# Patient Record
Sex: FEMALE | Race: WHITE | NOT HISPANIC OR LATINO | Employment: OTHER | URBAN - METROPOLITAN AREA
[De-identification: names, ages, dates, MRNs, and addresses within clinical notes are randomized per-mention and may not be internally consistent; named-entity substitution may affect disease eponyms.]

---

## 2017-01-21 DIAGNOSIS — G20.A1 PARKINSON DISEASE: ICD-10-CM

## 2017-01-24 RX ORDER — ENTACAPONE 200 MG/1
TABLET ORAL
Qty: 120 TAB | Refills: 2 | Status: SHIPPED | OUTPATIENT
Start: 2017-01-24 | End: 2017-02-25

## 2017-01-31 ENCOUNTER — TELEPHONE (OUTPATIENT)
Dept: NEUROLOGY | Facility: MEDICAL CENTER | Age: 72
End: 2017-01-31

## 2017-02-01 NOTE — TELEPHONE ENCOUNTER
Pt's sister is calling and she needed clarification on her sisters medication. She is taking the Sinemet 25/100 mg 2 tablets TID when she is to be taking 1 tablet TID. Pt's sister verbally understood clarification and will make sure her sister is only taking 1 tablet TID until her appointment with Dr. Harrell on 2/28/17. TURNER

## 2017-02-25 ENCOUNTER — APPOINTMENT (OUTPATIENT)
Dept: RADIOLOGY | Facility: MEDICAL CENTER | Age: 72
DRG: 565 | End: 2017-02-25
Attending: EMERGENCY MEDICINE
Payer: MEDICARE

## 2017-02-25 ENCOUNTER — HOSPITAL ENCOUNTER (INPATIENT)
Facility: MEDICAL CENTER | Age: 72
LOS: 3 days | DRG: 565 | End: 2017-02-28
Attending: EMERGENCY MEDICINE | Admitting: HOSPITALIST
Payer: MEDICARE

## 2017-02-25 ENCOUNTER — RESOLUTE PROFESSIONAL BILLING HOSPITAL PROF FEE (OUTPATIENT)
Dept: HOSPITALIST | Facility: MEDICAL CENTER | Age: 72
End: 2017-02-25
Payer: MEDICARE

## 2017-02-25 DIAGNOSIS — G20.A1 PARKINSON'S DISEASE: ICD-10-CM

## 2017-02-25 DIAGNOSIS — G20.B1 DYSKINESIA DUE TO PARKINSON'S DISEASE: ICD-10-CM

## 2017-02-25 DIAGNOSIS — R74.8 ELEVATED CPK: ICD-10-CM
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
## 2017-02-25 DIAGNOSIS — E87.6 HYPOKALEMIA: ICD-10-CM

## 2017-02-25 DIAGNOSIS — N30.01 ACUTE CYSTITIS WITH HEMATURIA: ICD-10-CM

## 2017-02-25 DIAGNOSIS — N39.0 URINARY TRACT INFECTION WITHOUT HEMATURIA, SITE UNSPECIFIED: ICD-10-CM

## 2017-02-25 DIAGNOSIS — R53.1 WEAKNESS: ICD-10-CM

## 2017-02-25 PROBLEM — M62.82 RHABDOMYOLYSIS: Status: ACTIVE | Noted: 2017-02-25

## 2017-02-25 PROBLEM — F03.90 DEMENTIA (HCC): Status: ACTIVE | Noted: 2017-02-25

## 2017-02-25 LAB
ALBUMIN SERPL BCP-MCNC: 4 G/DL (ref 3.2–4.9)
ALBUMIN/GLOB SERPL: 1.4 G/DL
ALP SERPL-CCNC: 110 U/L (ref 30–99)
ALT SERPL-CCNC: 25 U/L (ref 2–50)
ANION GAP SERPL CALC-SCNC: 8 MMOL/L (ref 0–11.9)
APPEARANCE UR: ABNORMAL
APTT PPP: 30.4 SEC (ref 24.7–36)
AST SERPL-CCNC: 51 U/L (ref 12–45)
BACTERIA #/AREA URNS HPF: ABNORMAL /HPF
BASOPHILS # BLD AUTO: 0.4 % (ref 0–1.8)
BASOPHILS # BLD: 0.04 K/UL (ref 0–0.12)
BILIRUB SERPL-MCNC: 1 MG/DL (ref 0.1–1.5)
BILIRUB UR QL STRIP.AUTO: NEGATIVE
BNP SERPL-MCNC: 87 PG/ML (ref 0–100)
BUN SERPL-MCNC: 13 MG/DL (ref 8–22)
CALCIUM SERPL-MCNC: 9 MG/DL (ref 8.4–10.2)
CHLORIDE SERPL-SCNC: 104 MMOL/L (ref 96–112)
CK SERPL-CCNC: 1411 U/L (ref 0–154)
CO2 SERPL-SCNC: 26 MMOL/L (ref 20–33)
COLOR UR: ABNORMAL
CREAT SERPL-MCNC: 0.54 MG/DL (ref 0.5–1.4)
CULTURE IF INDICATED INDCX: YES UA CULTURE
EOSINOPHIL # BLD AUTO: 0.01 K/UL (ref 0–0.51)
EOSINOPHIL NFR BLD: 0.1 % (ref 0–6.9)
EPI CELLS #/AREA URNS HPF: ABNORMAL /HPF
ERYTHROCYTE [DISTWIDTH] IN BLOOD BY AUTOMATED COUNT: 40.1 FL (ref 35.9–50)
GFR SERPL CREATININE-BSD FRML MDRD: >60 ML/MIN/1.73 M 2
GLOBULIN SER CALC-MCNC: 2.9 G/DL (ref 1.9–3.5)
GLUCOSE SERPL-MCNC: 94 MG/DL (ref 65–99)
GLUCOSE UR STRIP.AUTO-MCNC: NEGATIVE MG/DL
HCT VFR BLD AUTO: 40.7 % (ref 37–47)
HGB BLD-MCNC: 13.9 G/DL (ref 12–16)
IMM GRANULOCYTES # BLD AUTO: 0.03 K/UL (ref 0–0.11)
IMM GRANULOCYTES NFR BLD AUTO: 0.3 % (ref 0–0.9)
INR PPP: 1.01 (ref 0.87–1.13)
KETONES UR STRIP.AUTO-MCNC: 40 MG/DL
LEUKOCYTE ESTERASE UR QL STRIP.AUTO: NEGATIVE
LYMPHOCYTES # BLD AUTO: 1.3 K/UL (ref 1–4.8)
LYMPHOCYTES NFR BLD: 14 % (ref 22–41)
MCH RBC QN AUTO: 29.3 PG (ref 27–33)
MCHC RBC AUTO-ENTMCNC: 34.2 G/DL (ref 33.6–35)
MCV RBC AUTO: 85.7 FL (ref 81.4–97.8)
MICRO URNS: ABNORMAL
MONOCYTES # BLD AUTO: 0.76 K/UL (ref 0–0.85)
MONOCYTES NFR BLD AUTO: 8.2 % (ref 0–13.4)
MUCOUS THREADS #/AREA URNS HPF: ABNORMAL /HPF
NEUTROPHILS # BLD AUTO: 7.15 K/UL (ref 2–7.15)
NEUTROPHILS NFR BLD: 77 % (ref 44–72)
NITRITE UR QL STRIP.AUTO: POSITIVE
NRBC # BLD AUTO: 0 K/UL
NRBC BLD AUTO-RTO: 0 /100 WBC
PH UR STRIP.AUTO: 5.5 [PH]
PLATELET # BLD AUTO: 233 K/UL (ref 164–446)
PMV BLD AUTO: 9.9 FL (ref 9–12.9)
POTASSIUM SERPL-SCNC: 3.1 MMOL/L (ref 3.6–5.5)
PROT SERPL-MCNC: 6.9 G/DL (ref 6–8.2)
PROT UR QL STRIP: NEGATIVE MG/DL
PROTHROMBIN TIME: 13.1 SEC (ref 12–14.6)
RBC # BLD AUTO: 4.75 M/UL (ref 4.2–5.4)
RBC # URNS HPF: ABNORMAL /HPF
RBC UR QL AUTO: ABNORMAL
SODIUM SERPL-SCNC: 138 MMOL/L (ref 135–145)
SP GR UR STRIP.AUTO: 1.02
TROPONIN I SERPL-MCNC: <0.02 NG/ML (ref 0–0.04)
TSH SERPL DL<=0.005 MIU/L-ACNC: 2.35 UIU/ML (ref 0.35–5.5)
WBC # BLD AUTO: 9.3 K/UL (ref 4.8–10.8)
WBC #/AREA URNS HPF: ABNORMAL /HPF

## 2017-02-25 PROCEDURE — 87086 URINE CULTURE/COLONY COUNT: CPT

## 2017-02-25 PROCEDURE — A9270 NON-COVERED ITEM OR SERVICE: HCPCS | Performed by: HOSPITALIST

## 2017-02-25 PROCEDURE — 99223 1ST HOSP IP/OBS HIGH 75: CPT | Mod: AI | Performed by: HOSPITALIST

## 2017-02-25 PROCEDURE — 80053 COMPREHEN METABOLIC PANEL: CPT

## 2017-02-25 PROCEDURE — 96365 THER/PROPH/DIAG IV INF INIT: CPT

## 2017-02-25 PROCEDURE — 85730 THROMBOPLASTIN TIME PARTIAL: CPT

## 2017-02-25 PROCEDURE — 700101 HCHG RX REV CODE 250: Performed by: HOSPITALIST

## 2017-02-25 PROCEDURE — 770006 HCHG ROOM/CARE - MED/SURG/GYN SEMI*

## 2017-02-25 PROCEDURE — 700111 HCHG RX REV CODE 636 W/ 250 OVERRIDE (IP): Performed by: HOSPITALIST

## 2017-02-25 PROCEDURE — 70450 CT HEAD/BRAIN W/O DYE: CPT

## 2017-02-25 PROCEDURE — 71010 DX-CHEST-LIMITED (1 VIEW): CPT

## 2017-02-25 PROCEDURE — 84484 ASSAY OF TROPONIN QUANT: CPT

## 2017-02-25 PROCEDURE — 87040 BLOOD CULTURE FOR BACTERIA: CPT

## 2017-02-25 PROCEDURE — 82550 ASSAY OF CK (CPK): CPT

## 2017-02-25 PROCEDURE — 87186 SC STD MICRODIL/AGAR DIL: CPT

## 2017-02-25 PROCEDURE — 99285 EMERGENCY DEPT VISIT HI MDM: CPT

## 2017-02-25 PROCEDURE — 93005 ELECTROCARDIOGRAM TRACING: CPT | Performed by: EMERGENCY MEDICINE

## 2017-02-25 PROCEDURE — 83880 ASSAY OF NATRIURETIC PEPTIDE: CPT

## 2017-02-25 PROCEDURE — 73552 X-RAY EXAM OF FEMUR 2/>: CPT | Mod: RT

## 2017-02-25 PROCEDURE — 81001 URINALYSIS AUTO W/SCOPE: CPT

## 2017-02-25 PROCEDURE — 96367 TX/PROPH/DG ADDL SEQ IV INF: CPT

## 2017-02-25 PROCEDURE — 85025 COMPLETE CBC W/AUTO DIFF WBC: CPT

## 2017-02-25 PROCEDURE — 700105 HCHG RX REV CODE 258: Performed by: HOSPITALIST

## 2017-02-25 PROCEDURE — 700105 HCHG RX REV CODE 258: Performed by: EMERGENCY MEDICINE

## 2017-02-25 PROCEDURE — 84443 ASSAY THYROID STIM HORMONE: CPT

## 2017-02-25 PROCEDURE — 87077 CULTURE AEROBIC IDENTIFY: CPT

## 2017-02-25 PROCEDURE — 700102 HCHG RX REV CODE 250 W/ 637 OVERRIDE(OP): Performed by: HOSPITALIST

## 2017-02-25 PROCEDURE — 700111 HCHG RX REV CODE 636 W/ 250 OVERRIDE (IP): Performed by: EMERGENCY MEDICINE

## 2017-02-25 PROCEDURE — 85610 PROTHROMBIN TIME: CPT

## 2017-02-25 PROCEDURE — 73060 X-RAY EXAM OF HUMERUS: CPT | Mod: RT

## 2017-02-25 RX ORDER — PRAMIPEXOLE DIHYDROCHLORIDE 1.5 MG/1
0.75 TABLET ORAL 3 TIMES DAILY
Status: DISCONTINUED | OUTPATIENT
Start: 2017-02-25 | End: 2017-02-28 | Stop reason: HOSPADM

## 2017-02-25 RX ORDER — LACTULOSE 20 G/30ML
30 SOLUTION ORAL
Status: DISCONTINUED | OUTPATIENT
Start: 2017-02-25 | End: 2017-02-28 | Stop reason: HOSPADM

## 2017-02-25 RX ORDER — POTASSIUM CHLORIDE 7.45 MG/ML
10 INJECTION INTRAVENOUS ONCE
Status: COMPLETED | OUTPATIENT
Start: 2017-02-25 | End: 2017-02-25

## 2017-02-25 RX ORDER — ONDANSETRON 4 MG/1
4 TABLET, ORALLY DISINTEGRATING ORAL EVERY 4 HOURS PRN
Status: DISCONTINUED | OUTPATIENT
Start: 2017-02-25 | End: 2017-02-28 | Stop reason: HOSPADM

## 2017-02-25 RX ORDER — SODIUM CHLORIDE 9 MG/ML
INJECTION, SOLUTION INTRAVENOUS CONTINUOUS
Status: DISCONTINUED | OUTPATIENT
Start: 2017-02-25 | End: 2017-02-26

## 2017-02-25 RX ORDER — ONDANSETRON 2 MG/ML
4 INJECTION INTRAMUSCULAR; INTRAVENOUS EVERY 4 HOURS PRN
Status: DISCONTINUED | OUTPATIENT
Start: 2017-02-25 | End: 2017-02-28 | Stop reason: HOSPADM

## 2017-02-25 RX ORDER — AMOXICILLIN 250 MG
1 CAPSULE ORAL
Status: DISCONTINUED | OUTPATIENT
Start: 2017-02-25 | End: 2017-02-28 | Stop reason: HOSPADM

## 2017-02-25 RX ORDER — CEFTRIAXONE 1 G/1
1 INJECTION, POWDER, FOR SOLUTION INTRAMUSCULAR; INTRAVENOUS ONCE
Status: COMPLETED | OUTPATIENT
Start: 2017-02-25 | End: 2017-02-25

## 2017-02-25 RX ORDER — SODIUM CHLORIDE AND POTASSIUM CHLORIDE 150; 900 MG/100ML; MG/100ML
INJECTION, SOLUTION INTRAVENOUS CONTINUOUS
Status: DISCONTINUED | OUTPATIENT
Start: 2017-02-25 | End: 2017-02-26

## 2017-02-25 RX ORDER — ENEMA 19; 7 G/133ML; G/133ML
1 ENEMA RECTAL
Status: DISCONTINUED | OUTPATIENT
Start: 2017-02-25 | End: 2017-02-28 | Stop reason: HOSPADM

## 2017-02-25 RX ORDER — SODIUM CHLORIDE 9 MG/ML
INJECTION, SOLUTION INTRAVENOUS CONTINUOUS
Status: DISCONTINUED | OUTPATIENT
Start: 2017-02-25 | End: 2017-02-28 | Stop reason: HOSPADM

## 2017-02-25 RX ORDER — BISACODYL 10 MG
10 SUPPOSITORY, RECTAL RECTAL
Status: DISCONTINUED | OUTPATIENT
Start: 2017-02-25 | End: 2017-02-28 | Stop reason: HOSPADM

## 2017-02-25 RX ORDER — ENTACAPONE 200 MG/1
200 TABLET ORAL 3 TIMES DAILY
Status: DISCONTINUED | OUTPATIENT
Start: 2017-02-25 | End: 2017-02-28 | Stop reason: HOSPADM

## 2017-02-25 RX ORDER — POTASSIUM CHLORIDE 20 MEQ/1
40 TABLET, EXTENDED RELEASE ORAL ONCE
Status: ACTIVE | OUTPATIENT
Start: 2017-02-25 | End: 2017-02-26

## 2017-02-25 RX ORDER — ACETAMINOPHEN 325 MG/1
650 TABLET ORAL EVERY 6 HOURS PRN
Status: DISCONTINUED | OUTPATIENT
Start: 2017-02-25 | End: 2017-02-28 | Stop reason: HOSPADM

## 2017-02-25 RX ADMIN — ENOXAPARIN SODIUM 40 MG: 100 INJECTION SUBCUTANEOUS at 16:52

## 2017-02-25 RX ADMIN — POTASSIUM CHLORIDE 10 MEQ: 10 INJECTION, SOLUTION INTRAVENOUS at 14:45

## 2017-02-25 RX ADMIN — CEFTRIAXONE 1 G: 1 INJECTION, POWDER, FOR SOLUTION INTRAMUSCULAR; INTRAVENOUS at 13:44

## 2017-02-25 RX ADMIN — SODIUM CHLORIDE: 9 INJECTION, SOLUTION INTRAVENOUS at 13:43

## 2017-02-25 RX ADMIN — POTASSIUM CHLORIDE AND SODIUM CHLORIDE: 900; 150 INJECTION, SOLUTION INTRAVENOUS at 16:50

## 2017-02-25 RX ADMIN — CEFTRIAXONE SODIUM 1000 MG: 1 INJECTION, POWDER, FOR SOLUTION INTRAMUSCULAR; INTRAVENOUS at 16:47

## 2017-02-25 ASSESSMENT — LIFESTYLE VARIABLES
ALCOHOL_USE: NO
EVER_SMOKED: YES

## 2017-02-25 NOTE — ED NOTES
"Chief Complaint   Patient presents with   • GLF     this AM, unknown time   • Shoulder Injury     R    • Hip Injury     R   • Facial Injury     R     /77 mmHg  Pulse 80  Temp(Src) 37.3 °C (99.1 °F)  Resp 18  Ht 1.549 m (5' 1\")  Wt 61.689 kg (136 lb)  BMI 25.71 kg/m2  SpO2 97%    "

## 2017-02-25 NOTE — ED NOTES
The Medication Reconciliation process has been completed by interviewing the patient    Allergies have been reviewed  Antibiotic use in 30 days - NONE    Home Pharmacy:  [facility] - [place]

## 2017-02-25 NOTE — ED PROVIDER NOTES
ED Provider Note    CHIEF COMPLAINT  Chief Complaint   Patient presents with   • GLF     this AM, unknown time   • Shoulder Injury     R    • Hip Injury     R   • Facial Injury     R       HPI  Paola Brito is a 71 y.o. female who presents to the emergency department brought in by ambulance after falling in her kitchen. The patient has a history of dementia and usually gets around with her walker but was found lying on her right side on the kitchen floor earlier today. Potentially she could've been on the ground for as long as 6 hours but she is not really sure what time she fell. The patient was noted to have an abrasion on the right side of the head and complained of right shoulder pain and she indicates discomfort in the right mid thigh. She has not recently been ill that she has been falling frequently recently.    REVIEW OF SYSTEMS no headache no neck pain no chest pain or palpitations no shortness of breath. All other systems negative    PAST MEDICAL HISTORY  Past Medical History   Diagnosis Date   • Parkinson's disease (CMS-HCC)    • Urinary incontinence    • Hypertension        FAMILY HISTORY  Family History   Problem Relation Age of Onset   • Cancer Father    • Lung Disease Father    • Heart Disease Father    • Heart Disease Maternal Grandmother    • Heart Disease Maternal Grandfather    • Stroke Paternal Grandmother    • Stroke Paternal Grandfather        SOCIAL HISTORY  Social History     Social History   • Marital Status: Single     Spouse Name: N/A   • Number of Children: N/A   • Years of Education: N/A     Social History Main Topics   • Smoking status: Former Smoker     Types: Cigarettes   • Smokeless tobacco: None   • Alcohol Use: Yes      Comment: a couple glasses a couple times per week   • Drug Use: No   • Sexual Activity: Not Asked     Other Topics Concern   • None     Social History Narrative       SURGICAL HISTORY  Past Surgical History   Procedure Laterality Date   • Tonsillectomy N/A   "      CURRENT MEDICATIONS  Home Medications     Reviewed by Shayan Thomas (Pharmacy Tech) on 17 at 1255  Med List Status: Complete    Medication Last Dose Status    carbidopa-levodopa (SINEMET)  MG Tab 2017 Active    entacapone (COMTAN) 200 MG Tab 2017 Active    Pramipexole Dihydrochloride 0.75 MG Tab 2017 Active    sertraline (ZOLOFT) 50 MG Tab 2017 Active    spironolactone (ALDACTONE) 25 MG Tab 2017 Active                ALLERGIES  Allergies   Allergen Reactions   • Morphine Unspecified     Rxn - early    Family reports that she almost    • Pcn [Penicillins] Unspecified     Rxn - unspecified       PHYSICAL EXAM  VITAL SIGNS: /77 mmHg  Pulse 79  Temp(Src) 37.8 °C (100 °F)  Resp 14  Ht 1.549 m (5' 1\")  Wt 61.689 kg (136 lb)  BMI 25.71 kg/m2  SpO2 91%   Oxygen saturation is interpreted as adequate  Constitutional: Awake and verbal and she does not appear distressed  HENT: There is very slight erythema and swelling on the right side of the head consistent with mild contusion  Eyes: Extraocular motion is present without difficulty  Neck: Trachea midline no JVD no C-spine tenderness  Cardiovascular: Regular rate and rhythm  Lungs: Clear and equal bilaterally with no apparent difficulty breathing  Abdomen/Back: Soft nontender nondistended no peritoneal findings  Skin: Warm and dry  Musculoskeletal: No acute bony deformity, subjectively the patient had discomfort in the right shoulder but has full range of motion and I don't see any bony deformity or swelling. The right lower extremity the patient complained of mid thigh discomfort I don't see any deformity in the area the patient is able to move her right leg and right hip has adequate range of motion which does not seem especially painful.  Neurologic: Awake and verbal and moving her extremities    Laboratory  A CBC shows a white blood cell count 9.3 hemoglobin and hematocrit at 13.9 and basic " metabolic panel shows a slightly low potassium of 3.1. AST is elevated at 51 ALT is elevated at 111. Total CPK is 1411. Troponin is normal less than 0.02. INR is normal 1.01. Urinalysis is positive for nitrite and negative for leukocyte esterase there were 0-2 white blood cells but 100-150 red blood cells and moderate bacteria in the microscopic exam    EKG interpretation  A 12-lead EKG showed a sinus rhythm 78 bpm there is no pathologic ST elevation or depression or ectopy there is a left axis deviation    Radiology  CT-HEAD W/O   Final Result      1.  There is no acute intracranial hemorrhage.   2.  There is minimal right lateral infraorbital and facial superficial swelling but there are no underlying fractures. The scan is continued inferiorly to below the level of the maxillary sinuses.      DX-HUMERUS 2+ RIGHT   Final Result      1.  Negative right humerus series.      DX-FEMUR-2+ RIGHT   Final Result      1.  No radiographic evidence of acute traumatic injury.      DX-CHEST-LIMITED (1 VIEW)   Final Result      1.  No acute cardiac or pulmonary abnormalities are identified.        MEDICAL DECISION MAKING and DISPOSITION  In the emergency department an IV was established and the patient was placed on a cardiac monitor. The patient is receiving intravenous fluids and intravenous ceftriaxone and I have also ordered intravenous potassium supplementation. I reviewed the findings thus far available with the patient and her family and I reviewed the case with the hospitalist and the patient will be admitted for further evaluation and further treatment    IMPRESSION  1. Urinary tract infection with hematuria  2. Hypokalemia  3. Elevated total CPK  4. Recent falling  5. Right leg and right shoulder pain         Electronically signed by: Kirby Mays, 2/25/2017 3:00 PM

## 2017-02-26 LAB
ALBUMIN SERPL BCP-MCNC: 2.9 G/DL (ref 3.2–4.9)
ALBUMIN/GLOB SERPL: 1.3 G/DL
ALP SERPL-CCNC: 91 U/L (ref 30–99)
ALT SERPL-CCNC: 17 U/L (ref 2–50)
ANION GAP SERPL CALC-SCNC: 10 MMOL/L (ref 0–11.9)
AST SERPL-CCNC: 46 U/L (ref 12–45)
BASOPHILS # BLD AUTO: 0.6 % (ref 0–1.8)
BASOPHILS # BLD: 0.04 K/UL (ref 0–0.12)
BILIRUB SERPL-MCNC: 1.5 MG/DL (ref 0.1–1.5)
BUN SERPL-MCNC: 10 MG/DL (ref 8–22)
CALCIUM SERPL-MCNC: 8.5 MG/DL (ref 8.4–10.2)
CHLORIDE SERPL-SCNC: 112 MMOL/L (ref 96–112)
CHOLEST SERPL-MCNC: 116 MG/DL (ref 100–199)
CK SERPL-CCNC: 995 U/L (ref 0–154)
CO2 SERPL-SCNC: 20 MMOL/L (ref 20–33)
CREAT SERPL-MCNC: 0.78 MG/DL (ref 0.5–1.4)
EOSINOPHIL # BLD AUTO: 0.05 K/UL (ref 0–0.51)
EOSINOPHIL NFR BLD: 0.7 % (ref 0–6.9)
ERYTHROCYTE [DISTWIDTH] IN BLOOD BY AUTOMATED COUNT: 42.5 FL (ref 35.9–50)
GFR SERPL CREATININE-BSD FRML MDRD: >60 ML/MIN/1.73 M 2
GLOBULIN SER CALC-MCNC: 2.3 G/DL (ref 1.9–3.5)
GLUCOSE SERPL-MCNC: 66 MG/DL (ref 65–99)
HCT VFR BLD AUTO: 38 % (ref 37–47)
HDLC SERPL-MCNC: 55 MG/DL
HGB BLD-MCNC: 12.6 G/DL (ref 12–16)
IMM GRANULOCYTES # BLD AUTO: 0.03 K/UL (ref 0–0.11)
IMM GRANULOCYTES NFR BLD AUTO: 0.4 % (ref 0–0.9)
LDLC SERPL CALC-MCNC: 49 MG/DL
LYMPHOCYTES # BLD AUTO: 1.28 K/UL (ref 1–4.8)
LYMPHOCYTES NFR BLD: 17.9 % (ref 22–41)
MCH RBC QN AUTO: 29.4 PG (ref 27–33)
MCHC RBC AUTO-ENTMCNC: 33.2 G/DL (ref 33.6–35)
MCV RBC AUTO: 88.8 FL (ref 81.4–97.8)
MONOCYTES # BLD AUTO: 0.56 K/UL (ref 0–0.85)
MONOCYTES NFR BLD AUTO: 7.8 % (ref 0–13.4)
NEUTROPHILS # BLD AUTO: 5.21 K/UL (ref 2–7.15)
NEUTROPHILS NFR BLD: 72.6 % (ref 44–72)
NRBC # BLD AUTO: 0 K/UL
NRBC BLD AUTO-RTO: 0 /100 WBC
PLATELET # BLD AUTO: 207 K/UL (ref 164–446)
PMV BLD AUTO: 10 FL (ref 9–12.9)
POTASSIUM SERPL-SCNC: 3.9 MMOL/L (ref 3.6–5.5)
PROT SERPL-MCNC: 5.2 G/DL (ref 6–8.2)
RBC # BLD AUTO: 4.28 M/UL (ref 4.2–5.4)
SODIUM SERPL-SCNC: 142 MMOL/L (ref 135–145)
TRIGL SERPL-MCNC: 60 MG/DL (ref 0–149)
WBC # BLD AUTO: 7.2 K/UL (ref 4.8–10.8)

## 2017-02-26 PROCEDURE — 97535 SELF CARE MNGMENT TRAINING: CPT

## 2017-02-26 PROCEDURE — 80053 COMPREHEN METABOLIC PANEL: CPT

## 2017-02-26 PROCEDURE — 82550 ASSAY OF CK (CPK): CPT

## 2017-02-26 PROCEDURE — 700105 HCHG RX REV CODE 258: Performed by: INTERNAL MEDICINE

## 2017-02-26 PROCEDURE — G8988 SELF CARE GOAL STATUS: HCPCS | Mod: CK

## 2017-02-26 PROCEDURE — 700102 HCHG RX REV CODE 250 W/ 637 OVERRIDE(OP): Performed by: HOSPITALIST

## 2017-02-26 PROCEDURE — 700111 HCHG RX REV CODE 636 W/ 250 OVERRIDE (IP): Performed by: HOSPITALIST

## 2017-02-26 PROCEDURE — 700101 HCHG RX REV CODE 250: Performed by: HOSPITALIST

## 2017-02-26 PROCEDURE — 85025 COMPLETE CBC W/AUTO DIFF WBC: CPT

## 2017-02-26 PROCEDURE — 770006 HCHG ROOM/CARE - MED/SURG/GYN SEMI*

## 2017-02-26 PROCEDURE — 97166 OT EVAL MOD COMPLEX 45 MIN: CPT

## 2017-02-26 PROCEDURE — A9270 NON-COVERED ITEM OR SERVICE: HCPCS | Performed by: HOSPITALIST

## 2017-02-26 PROCEDURE — 94760 N-INVAS EAR/PLS OXIMETRY 1: CPT

## 2017-02-26 PROCEDURE — 99233 SBSQ HOSP IP/OBS HIGH 50: CPT | Performed by: INTERNAL MEDICINE

## 2017-02-26 PROCEDURE — 700105 HCHG RX REV CODE 258: Performed by: HOSPITALIST

## 2017-02-26 PROCEDURE — G8987 SELF CARE CURRENT STATUS: HCPCS | Mod: CL

## 2017-02-26 PROCEDURE — 80061 LIPID PANEL: CPT

## 2017-02-26 PROCEDURE — 36415 COLL VENOUS BLD VENIPUNCTURE: CPT

## 2017-02-26 RX ADMIN — PRAMIPEXOLE DIHYDROCHLORIDE 0.75 MG: 1.5 TABLET ORAL at 21:08

## 2017-02-26 RX ADMIN — SERTRALINE HYDROCHLORIDE 50 MG: 50 TABLET ORAL at 21:08

## 2017-02-26 RX ADMIN — ENOXAPARIN SODIUM 40 MG: 100 INJECTION SUBCUTANEOUS at 08:44

## 2017-02-26 RX ADMIN — CARBIDOPA AND LEVODOPA 1 TABLET: 25; 100 TABLET ORAL at 16:03

## 2017-02-26 RX ADMIN — PRAMIPEXOLE DIHYDROCHLORIDE 0.75 MG: 1.5 TABLET ORAL at 10:57

## 2017-02-26 RX ADMIN — CARBIDOPA AND LEVODOPA 1 TABLET: 25; 100 TABLET ORAL at 10:57

## 2017-02-26 RX ADMIN — ENTACAPONE 200 MG: 200 TABLET, FILM COATED ORAL at 10:57

## 2017-02-26 RX ADMIN — PRAMIPEXOLE DIHYDROCHLORIDE 0.75 MG: 1.5 TABLET ORAL at 16:03

## 2017-02-26 RX ADMIN — CARBIDOPA AND LEVODOPA 1 TABLET: 25; 100 TABLET ORAL at 21:07

## 2017-02-26 RX ADMIN — ENTACAPONE 200 MG: 200 TABLET, FILM COATED ORAL at 16:04

## 2017-02-26 RX ADMIN — POTASSIUM CHLORIDE AND SODIUM CHLORIDE: 900; 150 INJECTION, SOLUTION INTRAVENOUS at 01:38

## 2017-02-26 RX ADMIN — SODIUM CHLORIDE: 9 INJECTION, SOLUTION INTRAVENOUS at 16:03

## 2017-02-26 RX ADMIN — CEFTRIAXONE 2 G: 2 INJECTION, POWDER, FOR SOLUTION INTRAMUSCULAR; INTRAVENOUS at 08:44

## 2017-02-26 ASSESSMENT — ACTIVITIES OF DAILY LIVING (ADL): TOILETING: REQUIRES ASSIST

## 2017-02-26 NOTE — PROGRESS NOTES
1600-Admitted into room 210-1 from ER via gurney,transfered into bed via slider board,awake but not responding to questions asked,mumbles to self at times.Admit profile taken from sister,pt.lives at her home with  care giver 5x/week several hrs during the day and evenings, family cares for pt.during weekends,ambulates with walker at home.Incontinent of both urine and stool. Family reports has been falling at home,several bruising at different stages on her hip areas,BLE swelling and redness noted on her feet.Offered fluids would only take sips and spits out medication placed on her mouth.No coughing when she took fluids.Discussed POC with sister and verbalized understanding.Pt.turned to her leftside.Bed alarm activated.Will continue with care.

## 2017-02-26 NOTE — PROGRESS NOTES
Received report from day shift RN.  Patient in bed with HOB elevated.  No complaints of pain or discomfort.  Patient falling asleep.  Patient respirations regular and unlabored.  Bed rails up X 2, call light and belongings within reach, patient encouraged to call for assistance.  No needs at this time.  Will continue to monitor.

## 2017-02-26 NOTE — PROGRESS NOTES
Pt up in recliner. Took meds with thin liquid without any difficulty. Eating small bites of breakfast.

## 2017-02-26 NOTE — H&P
IDENTIFICATION:  This is a 71-year-old female.    PRIMARY CARE PHYSICIAN:  Camila Malin MD    NEUROLOGIC DOCTOR:  Eliezer Harrell MD    CHIEF COMPLAINT:  Fall with right-sided ___ injury, rhabdomyolysis, UTI,   dehydration, and hypokalemia.    HISTORY OF PRESENT ILLNESS:  A 71-year-old female that unfortunately suffers   from advanced Parkinson's disease and associated dementia.  The patient has a   caregiver not around the clock yet that from Monday through Friday and on the   weekends.  Family is taking care for her.  She had a fall that was unwitnessed   and she was found possibly being on the floor for 6 hours.  This is unclear,   though the patient had some right-sided pain.  She was imaged appropriately   and has no apparent fractures or injuries.  The patient is dehydrated.  She   has a low potassium.  Her CPK was found at 1400 and she has an abnormal urine.    She is chronically incontinent of urine as well.  She has a history of   hypertension.  The patient is here accompanied by the family; her daughter,   son, and daughter-in-law.  The patient has a very good setup at home, has been   ambulating with a walker at home, but apparently she was found without a   walker when she had the fall.  The patient is to be admitted for stabilization   and further treatment.    ALLERGIES:  TO MORPHINE AND PENICILLIN.    PRESCRIPTION MEDICATIONS:  On admission as follows:  1.  Aldactone 25 mg p.o. daily.  2.  Zoloft 50 mg daily.  3.  Pramipexole dihydrochloride 0.75 mg p.o. t.i.d.  4.  Comtan 200 mg p.o. t.i.d.  5.  Sinemet 25/100 mg one tablet p.o. t.i.d.    PAST MEDICAL HISTORY:  1.  Parkinson's disease, advancing.  2.  Dementia.  3.  Hypertension.  4.  Urinary incontinence.  5.  History of frequent falls.    PAST SURGICAL HISTORY:  Positive for T and A.    SOCIAL HISTORY:  Patient is a nondrinker and nonsmoker.  She lives with her   family.    FAMILY HISTORY:  Positive for cancer and heart  disease.    REVIEW OF SYSTEMS:  Unobtainable.  The patient is not a historian.    PHYSICAL EXAMINATION:  VITAL SIGNS:  The patient is found to have temperature 37.3, pulse 75,   respirations 15, blood pressure 144/77, and the patient is saturating 99% on   low flow oxygen.  GENERAL:  This is a pleasant  elderly female in no acute distress, no   acute respiratory distress.  Well-developed, well-nourished.  HEENT:  Normocephalic, atraumatic.  EOMI.  PERRLA.  Mucous membranes are dry.  NECK:  In free range of motion.  No lymphadenopathy or thyromegaly.  CHEST:  Normal bony structures.  LUNGS:  Clear to auscultation anteriorly.  HEART:  Regular rate.  S1 and S2 are normal.  No S3 or S4.  ABDOMEN:  Flat.  There is no tenderness.  No hepatosplenomegaly is   appreciated.  GENITOURINARY:  Normal external female genitalia.  RECTAL:  Exam is deferred.  MUSCULOSKELETAL:  No clubbing, cyanosis, or edema.  NEUROLOGIC:  Patient is alert and oriented x2.  She knows her name and where   she is, but otherwise she is not oriented.  She does have some cogwheel   rigidity.  She has poor recall.  She has a flat facial expression.  Gait is   not tested.    LABORATORY DATA AND IMAGING:  CBC is essentially with some increase in   neutrophils to 77 with left shift.  Chemistry panel is abnormal for a   potassium of 3.1.  AST of 51.  CPK is 1411.  Troponin negative.  BNP is low at   87.  Coagulation is negative.  Urinalysis shows 100-150 red cells, 2 white   cells with positive nitrites, large occult blood.  She does have bacteria   moderate.  Chest x-ray:  No acute cardiopulmonary abnormality is identified.    ___ on the right side is negative for fracture.  A CT of the head shows no   acute intracranial hemorrhage.  There is minimal right lateral infraorbital   and facial superficial swelling, but there are no underlying fractures.  The   scan is continued inferiorly to below the level of the maxillary sinuses.    ASSESSMENT AND  PLAN:  1.  Fall, ground level likely with no fracture, but superficial injuries   resulting rhabdomyolysis.  2.  Rhabdomyolysis with CPK of 1400.  The patient will need to be hydrated.  3.  Hypokalemia with a potassium of 3.1, for replenishment and hydration   again.  4.  Parkinson's disease with poor functional capabilities.  5.  Dementia, advancing related to her underlying Parkinson's disease as well.  6.  History of hypertension.  7.  Clinical dehydration.    The patient at this time is admitted for rehydration and treatment for her   rhabdomyolysis and UTI as well as superficial injuries.  The patient will be   admitted to the hospital with IV fluids and IV potassium replenishment as well   as pain control and IV antibiotics for urinary tract infection, hopefully it   resolved.  Patient will be assessed by PT, OT and assess for home safety.    Family is telling me that they have been upgraded in-home service as of next   week where patient will have 24/7 caregiver available.  For full further   details, please refer to the computer system and paper chart.    Time spent on admission is 45 minutes.  The patient will likely require 2+   overnights stay in light of her laboratory abnormalities, need for IV   hydration and IV antibiotics.       ____________________________________     MD VIKI GUERRERO / ALISIA    DD:  02/25/2017 14:53:10  DT:  02/25/2017 18:28:31    D#:  [id number]  Job#:  [id number]

## 2017-02-26 NOTE — CARE PLAN
Problem: Safety  Goal: Will remain free from injury  Intervention: Provide assistance with mobility  Pt assisted to sink to brush he rteeth and hair. Back to recliner chair with 1 person and FWW assist.       Problem: Mobility  Goal: Risk for activity intolerance will decrease  Will assist with mobility as needed.     Problem: Pain Management  Goal: Pain level will decrease to patient’s comfort goal  Pt denies pain.

## 2017-02-26 NOTE — PROGRESS NOTES
OT in the room now. Pt way more alert than earlier. Able to speak. Sitting  at the bedside. Will assist to the recliner chair. Will attempt meds.

## 2017-02-26 NOTE — CARE PLAN
Problem: Safety  Goal: Will remain free from falls  Intervention: Assess risk factors for falls  Encouraged patient to call for assistance with call light   Call light and belongings within reach  Bed alarm in use      Problem: Knowledge Deficit  Goal: Knowledge of the prescribed therapeutic regimen will improve  Intervention: Discuss information regarding therpeutic regimen and document in education  Implemented medication teaching, patient states verbal understanding          Problem: Pain Management  Goal: Pain level will decrease to patient’s comfort goal  Intervention: Follow pain managment plan developed in collaboration with patient and Interdisciplinary Team  Work with pt on comfort goal; pain management per MD orders.      Problem: Skin Integrity  Goal: Risk for impaired skin integrity will decrease  Intervention: Assess risk factors for impaired skin integrity and/or pressure ulcers  Assess skin   Implement Q 2 hr. Turns to prevent skin breakdown.

## 2017-02-26 NOTE — CARE PLAN
Problem: Safety  Goal: Will remain free from injury  Intervention: Collaborate with Interdisciplinary Team for safe transfer and mobilization techniques  History of falls,acivate fall protocol,PT/OT eval and treat,bed alarm at all times

## 2017-02-26 NOTE — THERAPY
"Occupational Therapy Evaluation completed.   Functional Status: A&Ox1. Arouses to voice and is agreeable to be EOB/OOB. Pleasant and cooperative. Tremors B UE's noted; PD. Incontinent of urine. MaxA with hygiene. Sup to sit with CGA. Scooting with Hanna. STS with CGA. ADL transfer with CGA, FWW. Ambulates to sink with CGA, FWW. Grooming with CGA/Hanna. LE dressing with MaxA. Tolerates UIC. Feeding with CGA.   Plan of Care: Will benefit from Occupational Therapy 3 times per week  Discharge Recommendations:  Equipment: Will Continue to Assess for Equipment Needs. Post-acute therapy recommended after discharged home.  Lives with family. Caregiver present to assist as well. Will need to obtain further details from family re: PLOF due to pt's decreased cognition.     See \"Rehab Therapy-Acute\" Patient Summary Report for complete documentation.    "

## 2017-02-26 NOTE — PROGRESS NOTES
While taking a sip of water this morning, patient had weak cough with small drink of water and kept closing eyes.  Morning medication held.  Not safe to administer medication per nursing judgement.

## 2017-02-26 NOTE — PROGRESS NOTES
Hospital Medicine Progress Note, Adult, Complex               Author: Shahzad Espinoza Date & Time created: 2/26/2017  7:25 AM     Interval History:  70yo F with PMHx of advanced PD and Dementia was admitted for GLF, rhabdomyolysis, and UTI.    Pt has no complaints but is demented.     Review of Systems:  Review of Systems   Unable to perform ROS: dementia       Physical Exam:  Physical Exam   Constitutional: She appears well-developed and well-nourished.   HENT:   Head: Normocephalic and atraumatic.   Eyes: Conjunctivae and EOM are normal. Pupils are equal, round, and reactive to light.   Neck: Normal range of motion. Neck supple.   Cardiovascular: Normal rate.  Exam reveals no gallop and no friction rub.    No murmur heard.  Pulmonary/Chest: Effort normal and breath sounds normal.   Abdominal: Soft. Bowel sounds are normal.   Neurological: She is alert. She has normal reflexes.   Skin: Skin is warm and dry.   Nursing note and vitals reviewed.      Labs:        Invalid input(s): [id number]  Recent Labs      02/25/17   1240  02/26/17   0340   CPKTOTAL  1411*  995*   TROPONINI  <0.02   --    BNPBTYPENAT  87   --      Recent Labs      02/25/17   1240  02/26/17   0340   SODIUM  138  142   POTASSIUM  3.1*  3.9   CHLORIDE  104  112   CO2  26  20   BUN  13  10   CREATININE  0.54  0.78   CALCIUM  9.0  8.5     Recent Labs      02/25/17   1240  02/26/17   0340   ALTSGPT  25 17   ASTSGOT  51*  46*   ALKPHOSPHAT  110*  91   TBILIRUBIN  1.0  1.5   GLUCOSE  94  66     Recent Labs      02/25/17   1240  02/26/17   0340   RBC  4.75  4.28   HEMOGLOBIN  13.9  12.6   HEMATOCRIT  40.7  38.0   PLATELETCT  233  207   PROTHROMBTM  13.1   --    APTT  30.4   --    INR  1.01   --      Recent Labs      02/25/17   1240  02/26/17   0340   WBC  9.3  7.2   NEUTSPOLYS  77.00*  72.60*   LYMPHOCYTES  14.00*  17.90*   MONOCYTES  8.20  7.80   EOSINOPHILS  0.10  0.70   BASOPHILS  0.40  0.60   ASTSGOT  51*  46*   ALTSGPT  25 17   ALKPHOSPHAT  110*   91   TBILIRUBIN  1.0  1.5           Hemodynamics:  Temp (24hrs), Av.1 °C (98.7 °F), Min:36.6 °C (97.8 °F), Max:37.8 °C (100 °F)  Temperature: 36.7 °C (98 °F)  Pulse  Av.8  Min: 76  Max: 89Heart Rate (Monitored): 89  Blood Pressure : 146/62 mmHg, NIBP: 150/71 mmHg     Respiratory:    Respiration: 20, Pulse Oximetry: 98 %, O2 Daily Delivery Respiratory : Room Air with O2 Available        RUL Breath Sounds: Clear, RML Breath Sounds: Diminished, RLL Breath Sounds: Diminished, SARAH Breath Sounds: Clear, LLL Breath Sounds: Diminished  Fluids:    Intake/Output Summary (Last 24 hours) at 17 0799  Last data filed at 17 0135   Gross per 24 hour   Intake    215 ml   Output      0 ml   Net    215 ml     Weight: 62.4 kg (137 lb 9.1 oz)  GI/Nutrition:  Orders Placed This Encounter   Procedures   • Diet Order     Standing Status: Standing      Number of Occurrences: 1      Standing Expiration Date:      Order Specific Question:  Diet:     Answer:  Regular [1]     Medical Decision Making, by Problem:  Active Hospital Problems    Diagnosis   • Rhabdomyolysis [M62.82]  Fall from Ground-level  - after being down for>6hrs; no apparent traumatic injuries noted  - cont IVFs and monitoring fluid status  - CPK improving; cont monitoring  - PT/OT treatment appreciated     • Hypokalemia [E87.6]  - resolved with replacement  - cont monitoring      • UTI (urinary tract infection) [N39.0]  - cont IV Ceftriaxone; awaiting UCx results     • Dementia [F03.90]   • Parkinson's disease (CMS-HCC) [G20]  - stable; cont outpt care and supportive care         EKG reviewed, Medications reviewed, Labs reviewed and Radiology images reviewed        DVT Prophylaxis: Enoxaparin (Lovenox)      Antibiotics: Treating active infection/contamination beyond 24 hours perioperative coverage  Assessed for rehab: Patient was assess for and/or received rehabilitation services during this hospitalization

## 2017-02-26 NOTE — PROGRESS NOTES
Patient sleeping comfortably in bed, no distress noted.  Patient respirations regular and unlabored.  Bed rails up X 2, call light and belongings within reach.  Will continue to monitor.

## 2017-02-26 NOTE — PROGRESS NOTES
Pt turned and linens changed, inc of urine. Pt does not open eyes or respond to stimuli of being turned. Asked her to open her eyes and she lifted eyebrows however was not able to open her eyes. She can slightly wiggle toes on command. Withdraws to tickling her feet. Unable to safely feed or offer food at this time as pt is unable to remain alert to safely swallow. VSS. Will discuss with hospitalist.

## 2017-02-26 NOTE — DISCHARGE PLANNING
Patient reviewed in morning rounds.  Patient reportedly lives at home alone with caregivers and family support. No current SS needs noted.  SS to monitor and assist as needed.

## 2017-02-27 ENCOUNTER — TELEPHONE (OUTPATIENT)
Dept: NEUROLOGY | Facility: MEDICAL CENTER | Age: 72
End: 2017-02-27

## 2017-02-27 PROBLEM — M62.82 RHABDOMYOLYSIS: Status: RESOLVED | Noted: 2017-02-25 | Resolved: 2017-02-27

## 2017-02-27 PROBLEM — E87.6 HYPOKALEMIA: Status: RESOLVED | Noted: 2017-02-25 | Resolved: 2017-02-27

## 2017-02-27 PROBLEM — F03.90 DEMENTIA (HCC): Status: RESOLVED | Noted: 2017-02-25 | Resolved: 2017-02-27

## 2017-02-27 LAB
ALBUMIN SERPL BCP-MCNC: 2.9 G/DL (ref 3.2–4.9)
ALBUMIN/GLOB SERPL: 1.2 G/DL
ALP SERPL-CCNC: 92 U/L (ref 30–99)
ALT SERPL-CCNC: 5 U/L (ref 2–50)
ANION GAP SERPL CALC-SCNC: 6 MMOL/L (ref 0–11.9)
AST SERPL-CCNC: 35 U/L (ref 12–45)
BACTERIA UR CULT: ABNORMAL
BACTERIA UR CULT: ABNORMAL
BASOPHILS # BLD AUTO: 0.4 % (ref 0–1.8)
BASOPHILS # BLD: 0.03 K/UL (ref 0–0.12)
BILIRUB SERPL-MCNC: 1.1 MG/DL (ref 0.1–1.5)
BUN SERPL-MCNC: 9 MG/DL (ref 8–22)
CALCIUM SERPL-MCNC: 8.5 MG/DL (ref 8.4–10.2)
CHLORIDE SERPL-SCNC: 111 MMOL/L (ref 96–112)
CK SERPL-CCNC: 595 U/L (ref 0–154)
CO2 SERPL-SCNC: 23 MMOL/L (ref 20–33)
CREAT SERPL-MCNC: 0.6 MG/DL (ref 0.5–1.4)
EKG IMPRESSION: NORMAL
EOSINOPHIL # BLD AUTO: 0.09 K/UL (ref 0–0.51)
EOSINOPHIL NFR BLD: 1.1 % (ref 0–6.9)
ERYTHROCYTE [DISTWIDTH] IN BLOOD BY AUTOMATED COUNT: 42.6 FL (ref 35.9–50)
GFR SERPL CREATININE-BSD FRML MDRD: >60 ML/MIN/1.73 M 2
GLOBULIN SER CALC-MCNC: 2.5 G/DL (ref 1.9–3.5)
GLUCOSE SERPL-MCNC: 95 MG/DL (ref 65–99)
HCT VFR BLD AUTO: 38.6 % (ref 37–47)
HGB BLD-MCNC: 13 G/DL (ref 12–16)
IMM GRANULOCYTES # BLD AUTO: 0.02 K/UL (ref 0–0.11)
IMM GRANULOCYTES NFR BLD AUTO: 0.2 % (ref 0–0.9)
LYMPHOCYTES # BLD AUTO: 1.31 K/UL (ref 1–4.8)
LYMPHOCYTES NFR BLD: 16.2 % (ref 22–41)
MCH RBC QN AUTO: 29.6 PG (ref 27–33)
MCHC RBC AUTO-ENTMCNC: 33.7 G/DL (ref 33.6–35)
MCV RBC AUTO: 87.9 FL (ref 81.4–97.8)
MONOCYTES # BLD AUTO: 0.59 K/UL (ref 0–0.85)
MONOCYTES NFR BLD AUTO: 7.3 % (ref 0–13.4)
NEUTROPHILS # BLD AUTO: 6.06 K/UL (ref 2–7.15)
NEUTROPHILS NFR BLD: 74.8 % (ref 44–72)
NRBC # BLD AUTO: 0 K/UL
NRBC BLD AUTO-RTO: 0 /100 WBC
PLATELET # BLD AUTO: 209 K/UL (ref 164–446)
PMV BLD AUTO: 10.5 FL (ref 9–12.9)
POTASSIUM SERPL-SCNC: 3.7 MMOL/L (ref 3.6–5.5)
PROT SERPL-MCNC: 5.4 G/DL (ref 6–8.2)
RBC # BLD AUTO: 4.39 M/UL (ref 4.2–5.4)
SIGNIFICANT IND 70042: ABNORMAL
SITE SITE: ABNORMAL
SODIUM SERPL-SCNC: 140 MMOL/L (ref 135–145)
SOURCE SOURCE: ABNORMAL
WBC # BLD AUTO: 8.1 K/UL (ref 4.8–10.8)

## 2017-02-27 PROCEDURE — 770006 HCHG ROOM/CARE - MED/SURG/GYN SEMI*

## 2017-02-27 PROCEDURE — 97161 PT EVAL LOW COMPLEX 20 MIN: CPT

## 2017-02-27 PROCEDURE — G8978 MOBILITY CURRENT STATUS: HCPCS | Mod: CK

## 2017-02-27 PROCEDURE — 82550 ASSAY OF CK (CPK): CPT

## 2017-02-27 PROCEDURE — 85025 COMPLETE CBC W/AUTO DIFF WBC: CPT

## 2017-02-27 PROCEDURE — G8979 MOBILITY GOAL STATUS: HCPCS | Mod: CI

## 2017-02-27 PROCEDURE — A9270 NON-COVERED ITEM OR SERVICE: HCPCS | Performed by: HOSPITALIST

## 2017-02-27 PROCEDURE — 80053 COMPREHEN METABOLIC PANEL: CPT

## 2017-02-27 PROCEDURE — 700102 HCHG RX REV CODE 250 W/ 637 OVERRIDE(OP): Performed by: HOSPITALIST

## 2017-02-27 PROCEDURE — 99233 SBSQ HOSP IP/OBS HIGH 50: CPT | Performed by: INTERNAL MEDICINE

## 2017-02-27 PROCEDURE — 700105 HCHG RX REV CODE 258: Performed by: HOSPITALIST

## 2017-02-27 PROCEDURE — 700111 HCHG RX REV CODE 636 W/ 250 OVERRIDE (IP): Performed by: HOSPITALIST

## 2017-02-27 PROCEDURE — 36415 COLL VENOUS BLD VENIPUNCTURE: CPT

## 2017-02-27 RX ORDER — SULFAMETHOXAZOLE AND TRIMETHOPRIM 800; 160 MG/1; MG/1
1 TABLET ORAL 2 TIMES DAILY
Qty: 6 TAB | Refills: 0 | Status: SHIPPED | OUTPATIENT
Start: 2017-02-27 | End: 2017-03-02

## 2017-02-27 RX ADMIN — CARBIDOPA AND LEVODOPA 1 TABLET: 25; 100 TABLET ORAL at 20:57

## 2017-02-27 RX ADMIN — CARBIDOPA AND LEVODOPA 1 TABLET: 25; 100 TABLET ORAL at 09:06

## 2017-02-27 RX ADMIN — ENTACAPONE 200 MG: 200 TABLET, FILM COATED ORAL at 17:00

## 2017-02-27 RX ADMIN — PRAMIPEXOLE DIHYDROCHLORIDE 0.75 MG: 1.5 TABLET ORAL at 17:01

## 2017-02-27 RX ADMIN — ENTACAPONE 200 MG: 200 TABLET, FILM COATED ORAL at 22:50

## 2017-02-27 RX ADMIN — SERTRALINE HYDROCHLORIDE 50 MG: 50 TABLET ORAL at 21:05

## 2017-02-27 RX ADMIN — CEFTRIAXONE 2 G: 2 INJECTION, POWDER, FOR SOLUTION INTRAMUSCULAR; INTRAVENOUS at 09:06

## 2017-02-27 RX ADMIN — ENTACAPONE 200 MG: 200 TABLET, FILM COATED ORAL at 09:06

## 2017-02-27 RX ADMIN — ENOXAPARIN SODIUM 40 MG: 100 INJECTION SUBCUTANEOUS at 09:06

## 2017-02-27 RX ADMIN — PRAMIPEXOLE DIHYDROCHLORIDE 0.75 MG: 1.5 TABLET ORAL at 20:57

## 2017-02-27 RX ADMIN — CARBIDOPA AND LEVODOPA 1 TABLET: 25; 100 TABLET ORAL at 17:00

## 2017-02-27 RX ADMIN — PRAMIPEXOLE DIHYDROCHLORIDE 0.75 MG: 1.5 TABLET ORAL at 09:06

## 2017-02-27 ASSESSMENT — GAIT ASSESSMENTS
DEVIATION: ATAXIC;DECREASED BASE OF SUPPORT
ASSISTIVE DEVICE: FRONT WHEEL WALKER
DISTANCE (FEET): 50
GAIT LEVEL OF ASSIST: MINIMAL ASSIST

## 2017-02-27 NOTE — PROGRESS NOTES
Pt continued to refuse care and to speak with CHIQUI Hamilton.   Pt has hx of dementia and disoriented x3, questionable for pt's understanding of the risks of her refusal.   Pt is strongly refusing any attempt for care    Ms. Mylene Bragg, pt's sister, reached via telephone in order to let her speak with the pt in hopes of getting the pt to agree with care.

## 2017-02-27 NOTE — PROGRESS NOTES
"Pt finished talking with sister via phone.  Pt asked for permission for care, pt still strongly refused stating \"I'm 71 year old, I've lived a good life. If I go, then that's it. I refuse and I do not want to discuss it any further.\" Pt noted to evade answering questions to assess for neurochecks.  sister Chakraborty called back again to verify whether pt agreed to her, and Mylene said that pt also refused, and that \"she's being difficult.\"    CHIQUI Hamilton talking with sister Chakraborty.     CHIQUI Hamilton received permission from sister Chakraborty and POA about changing and giving perineal care to pt. Will provide utmost privacy and will perform with only female employees to give care. Will let day shift be aware.  "

## 2017-02-27 NOTE — PROGRESS NOTES
Pt resting in bed. Awakens to voice. Ate breakfast by herself. Took meds. VSS. No signs of distress noted. Denies pain. Assisted to recliner chair. Nephew at bedside. Updates provided.

## 2017-02-27 NOTE — TELEPHONE ENCOUNTER
Hi Anna,     The sister of this PT, Leslie, left us a voicemail asking to confirm her appt w/ Julio Cesar tomorrow and also requested to that his MA call her back.     Would you mind calling her today?   Her # is [phone]     Thanks!   Georgie in scheduling        Called and LM for pt's sister to return my call to discuss all her questions and concerns. TURNER

## 2017-02-27 NOTE — PROGRESS NOTES
Pt refusing linen and gown change, perineal care, repositioning, vital signs taking.   Education provided, pt still continues to refuse.   Pt's gown and linens are soiled.    CHIQUI Hamilton called to talk with pt.

## 2017-02-27 NOTE — FACE TO FACE
Face to Face Supporting Documentation - Home Health    The encounter with this patient was in whole or in part the primary reason for home health admission.    Date of encounter:   Patient:                    MRN:                       YOB: 2016  Paola Brito  [id number]  1945     Home health to see patient for:  Skilled Nursing care for assessment, interventions & education and Physical Therapy evaluation and treatment    Skilled need for:  Exacerbation of Chronic Disease State Chronic Debility; Parkinson's disease    Skilled nursing interventions to include:  Comment: Physical Therapy    Homebound status evidenced by:  Need the aid of supportive devices such as crutches, canes, wheelchairs or walkers. Leaving home requires a considerable and taxing effort. There is a normal inability to leave the home.    Community Physician to provide follow up care: Camila Malin M.D.     Optional Interventions? No      I certify the face to face encounter for this home health care referral meets the CMS requirements and the encounter/clinical assessment with the patient was, in whole, or in part, for the medical condition(s) listed above, which is the primary reason for home health care. Based on my clinical findings: the service(s) are medically necessary, support the need for home health care, and the homebound criteria are met.  I certify that this patient has had a face to face encounter by the clinical nurse specialist working collaboratively with me.  Shahzad Espinoza M.D. - NPI: [id number]

## 2017-02-27 NOTE — DISCHARGE PLANNING
CCS received notification from [facility]. The referral has been accepted by [facility]

## 2017-02-27 NOTE — DISCHARGE PLANNING
CCS received a SNF choice form. Per the choice form the referral has been sent to [facility]

## 2017-02-27 NOTE — CARE PLAN
Problem: Safety  Goal: Will remain free from injury  Outcome: PROGRESSING AS EXPECTED  Bed locked and low, controls and alarms on.  Call light button within reach, but pt does not use it.  x2 siderails up    Problem: Infection  Goal: Will remain free from infection  Outcome: PROGRESSING AS EXPECTED  Pt on iv abx, tolerating appropriately.  wctm

## 2017-02-27 NOTE — PROGRESS NOTES
"Attempted to changed and clean pt since chucks and gowns are soaked and soiled.  Pt refused stating \"Whatever you are doing, I don't want any.\"   Education and explanation given, pt continues to refuse.  IVF also stopped per pt's demand.  wctm  "

## 2017-02-27 NOTE — THERAPY
"Physical Therapy Evaluation completed.   Bed Mobility:  Supine to Sit:  (NT, pt sitting up in chair)  Transfers: Sit to Stand: Contact Guard Assist  Gait: Level Of Assist: Minimal Assist with Front-Wheel Walker, ataxic gait, scissoring steps.      Plan of Care: Will benefit from Physical Therapy 5 times per week  Discharge Recommendations: Equipment: Will Continue to Assess for Equipment Needs. Post-acute therapy recommended before discharged home, recommend SNF.     See \"Rehab Therapy-Acute\" Patient Summary Report for complete documentation.     "

## 2017-02-27 NOTE — PROGRESS NOTES
Received report from day RN.  Pt in bed, q2 turns performed.  Bed locked and low, controls and alarms on, call light button within reach.   tm

## 2017-02-27 NOTE — PROGRESS NOTES
Called with reports that pt is refusing all care including  care secondary to incontinence. Upon arrival, pt is very argumentative and will not discuss any issues. Demands to speak with the director of the hospital. Pt does not present the ability to answer A+O questions. Pt also does not present any evidence of understanding of the risks of refusal of care. With Hx of dementia and confused state, there may be need for the provision of cherry care against pt's objection. Will attempt to contact family to discuss the situaton further.

## 2017-02-28 ENCOUNTER — APPOINTMENT (OUTPATIENT)
Dept: NEUROLOGY | Facility: MEDICAL CENTER | Age: 72
End: 2017-02-28
Payer: MEDICARE

## 2017-02-28 VITALS
OXYGEN SATURATION: 97 % | WEIGHT: 137.57 LBS | TEMPERATURE: 98.6 F | HEART RATE: 75 BPM | RESPIRATION RATE: 18 BRPM | SYSTOLIC BLOOD PRESSURE: 155 MMHG | BODY MASS INDEX: 25.97 KG/M2 | DIASTOLIC BLOOD PRESSURE: 78 MMHG | HEIGHT: 61 IN

## 2017-02-28 PROBLEM — F03.90 DEMENTIA (HCC): Status: ACTIVE | Noted: 2017-02-28

## 2017-02-28 PROBLEM — M62.82 RHABDOMYOLYSIS: Status: ACTIVE | Noted: 2017-02-28

## 2017-02-28 PROCEDURE — 97112 NEUROMUSCULAR REEDUCATION: CPT

## 2017-02-28 PROCEDURE — 99239 HOSP IP/OBS DSCHRG MGMT >30: CPT | Performed by: INTERNAL MEDICINE

## 2017-02-28 PROCEDURE — G8988 SELF CARE GOAL STATUS: HCPCS | Mod: CL

## 2017-02-28 PROCEDURE — 97535 SELF CARE MNGMENT TRAINING: CPT

## 2017-02-28 PROCEDURE — A9270 NON-COVERED ITEM OR SERVICE: HCPCS | Performed by: HOSPITALIST

## 2017-02-28 PROCEDURE — G8989 SELF CARE D/C STATUS: HCPCS | Mod: CL

## 2017-02-28 PROCEDURE — 700111 HCHG RX REV CODE 636 W/ 250 OVERRIDE (IP): Performed by: HOSPITALIST

## 2017-02-28 PROCEDURE — 97530 THERAPEUTIC ACTIVITIES: CPT

## 2017-02-28 PROCEDURE — 700102 HCHG RX REV CODE 250 W/ 637 OVERRIDE(OP): Performed by: HOSPITALIST

## 2017-02-28 PROCEDURE — 700105 HCHG RX REV CODE 258: Performed by: HOSPITALIST

## 2017-02-28 RX ADMIN — CARBIDOPA AND LEVODOPA 1 TABLET: 25; 100 TABLET ORAL at 14:41

## 2017-02-28 RX ADMIN — ENOXAPARIN SODIUM 40 MG: 100 INJECTION SUBCUTANEOUS at 08:46

## 2017-02-28 RX ADMIN — CARBIDOPA AND LEVODOPA 1 TABLET: 25; 100 TABLET ORAL at 08:43

## 2017-02-28 RX ADMIN — CEFTRIAXONE 2 G: 2 INJECTION, POWDER, FOR SOLUTION INTRAMUSCULAR; INTRAVENOUS at 08:46

## 2017-02-28 RX ADMIN — ENTACAPONE 200 MG: 200 TABLET, FILM COATED ORAL at 08:43

## 2017-02-28 RX ADMIN — PRAMIPEXOLE DIHYDROCHLORIDE 0.75 MG: 1.5 TABLET ORAL at 14:41

## 2017-02-28 RX ADMIN — PRAMIPEXOLE DIHYDROCHLORIDE 0.75 MG: 1.5 TABLET ORAL at 08:43

## 2017-02-28 RX ADMIN — ENTACAPONE 200 MG: 200 TABLET, FILM COATED ORAL at 14:41

## 2017-02-28 NOTE — PROGRESS NOTES
Pt awake on initial rounds. Pleasant and cooperative. Large incontinent BM. Pt cleaned and turned. Set up for breakfast which she ate herself. Hospitalist at the bedside.

## 2017-02-28 NOTE — DISCHARGE SUMMARY
DATE OF ADMISSION:  02/25/2017    DATE OF DISCHARGE:  02/27/2017    DISCHARGE DIAGNOSES:  1.  Ground level fall.  2.  Rhabdomyolysis.  3.  Hypokalemia, resolved.  4.  Urinary tract infection, treating.  5.  Dementia.  6.  Parkinson's disease.    CONSULTANTS:  None.    CONDITION:  Fair.    DISPOSITION:  Skilled nursing facility.    DIET:  Regular diet.    ACTIVITY:  As tolerated.    HOSPITAL COURSE:  Please see H and P for details regarding initial hospital   presentation.  In summary, a 71-year-old female with past medical history of   advanced Parkinson's disease and dementia, was admitted to the hospital for   ground level fall with resulting rhabdomyolysis as well as urinary tract   infection.  Patient was treated with IV fluids.  Initial workup in the ER   including CT head and x-rays of right humerus and right femur ____ was found   on her right side, all of which were unremarkable.  Patient was started on IV   fluid hydration as well as IV antibiotics given UA results, urine cultures   were obtained and was positive for pansensitive E. coli.  Patient will be   discharged on oral Bactrim to complete and additional 3 days of oral   antibiotics.  In the meantime, the patient's CPK continued to trend   down/improved with IV fluid hydration.  The patient was also found to be   hypokalemic, which resolved with p.o. supplementation.  In regards to the   patient's Parkinson's disease and dementia, supportive care and outpatient   medications were continued during the hospital course.  Physical therapy and   occupational therapy was provided for the patient as recommended for patient   to be discharged to skilled nursing facility to continue rehabilitation.    According to family at bedside, patient has been having frequent falls and we   recommended a skilled nursing facility for further rehabilitation is   appropriate at this time.  The patient and family at bedside, agrees with the   plan.    DISCHARGE  MEDICATIONS:  1.  Sinemet 25/100 mg tab, 1 tab p.o. t.i.d.  2.  Comtan 200 mg p.o. t.i.d.  3.  Pramipexole 0.75 mg p.o. t.i.d.  4.  Sertraline 50 mg p.o. daily.  5.  Spironolactone 25 mg p.o. daily.  6.  Bactrim 800/160 mg 1 tab p.o. b.i.d. for 3 days.    INSTRUCTIONS:  Patient was instructed to return to the ER in the event of   worsening symptoms.  I have counseled the patient and the family about the   importance of compliance and the patient and family have agreed to proceed   with all medical recommendations and followup plan as indicated above.  The   patient understands that all medications come with benefits and risks, risks   may include permanent injury or death and these risks can be minimized with   close reassessment and monitoring.    FOLLOWUP DETAILS:  1.  Patient to follow up with primary care physician, Dr. Malin in 1-2   weeks.  2.  Patient to follow up with your neurologist as scheduled that the pain.    Time of discharge, 42 minutes.       ____________________________________     MD PETRONA Lazcano / ALISIA    DD:  02/27/2017 13:39:49  DT:  02/27/2017 22:02:04    D#:  [id number]  Job#:  [id number]

## 2017-02-28 NOTE — DISCHARGE PLANNING
RICHY spoke monica Barney at [facility] and scheduled patient transfer for 5 pm today.  RICHY to complete Cobra and provide to Floor RN.

## 2017-02-28 NOTE — PROGRESS NOTES
Hospital Medicine Progress Note, Adult, Complex               Author: Ruben Bennett Date & Time created: 2017  8:30 AM     Interval History:  Feels well and denies pain/problems.  Walked with PT but still needs help and rec. For snf transfer.  Pleasant but poor memory and poor historian.    Review of Systems:  Review of Systems   Unable to perform ROS: dementia       Physical Exam:  Physical Exam    Labs:        Invalid input(s): [id number]  Recent Labs      17   1240  17   0451   CPKTOTAL  1411*  995*  595*   TROPONINI  <0.02   --    --    BNPBTYPENAT  87   --    --      Recent Labs      17   045   SODIUM  138  142  140   POTASSIUM  3.1*  3.9  3.7   CHLORIDE  104  112  111   CO2  26  20  23   BUN  13  10  9   CREATININE  0.54  0.78  0.60   CALCIUM  9.0  8.5  8.5     Recent Labs      17   12417   045   ALTSGPT    5   ASTSGOT  51*  46*  35   ALKPHOSPHAT  110*  91  92   TBILIRUBIN  1.0  1.5  1.1   GLUCOSE  94  66  95     Recent Labs      17   0451   RBC  4.75  4.28  4.39   HEMOGLOBIN  13.9  12.6  13.0   HEMATOCRIT  40.7  38.0  38.6   PLATELETCT  233  207  209   PROTHROMBTM  13.1   --    --    APTT  30.4   --    --    INR  1.01   --    --      Recent Labs      17   1240  17   0451   WBC  9.3  7.2  8.1   NEUTSPOLYS  77.00*  72.60*  74.80*   LYMPHOCYTES  14.00*  17.90*  16.20*   MONOCYTES  8.20  7.80  7.30   EOSINOPHILS  0.10  0.70  1.10   BASOPHILS  0.40  0.60  0.40   ASTSGOT  51*  46*  35   ALTSGPT  25  17  5   ALKPHOSPHAT  110*  91  92   TBILIRUBIN  1.0  1.5  1.1           Hemodynamics:  Temp (24hrs), Av.7 °C (98 °F), Min:36.6 °C (97.8 °F), Max:37 °C (98.6 °F)  Temperature: 37 °C (98.6 °F)  Pulse  Av.7  Min: 58  Max: 89   Blood Pressure : 129/67 mmHg     Respiratory:    Respiration: 18, Pulse Oximetry: 97 %        RUL Breath  Sounds: Clear, RML Breath Sounds: Diminished, RLL Breath Sounds: Diminished, SARAH Breath Sounds: Clear, LLL Breath Sounds: Diminished  Fluids:    Intake/Output Summary (Last 24 hours) at 02/28/17 0830  Last data filed at 02/28/17 0454   Gross per 24 hour   Intake      0 ml   Output    250 ml   Net   -250 ml        GI/Nutrition:  Orders Placed This Encounter   Procedures   • Diet Order     Standing Status: Standing      Number of Occurrences: 1      Standing Expiration Date:      Order Specific Question:  Diet:     Answer:  Regular [1]     Medical Decision Making, by Problem:  Active Hospital Problems    Diagnosis   • Rhabdomyolysis [M62.82], resolving with rx; stable for discharge; encourage po intake   • Dementia [F03.90] stable, will need 24/7 supervision for safety.   • UTI (urinary tract infection) [N39.0] E coli with sensitivity noted; on rx with improvement   • Parkinson's disease (CMS-HCC) [G20], progressive with dementia/unsteady gait; will need more supervision.  Will discuss with family.       Core Measures

## 2017-02-28 NOTE — CARE PLAN
Problem: Safety  Goal: Will remain free from injury  Outcome: PROGRESSING AS EXPECTED  Pt does not call.  Bed locked and low, controls and alarms on  Pt visible from nurse's station.    Problem: Discharge Barriers/Planning  Goal: Patient’s continuum of care needs will be met  Possible transfer to [facility] in AM.    Problem: Skin Integrity  Goal: Risk for impaired skin integrity will decrease  Outcome: PROGRESSING AS EXPECTED  q2 turns provided, pt is cooperative

## 2017-02-28 NOTE — CARE PLAN
Problem: Safety  Goal: Will remain free from injury  Pt transferring to SNF today for further PT/OT needs.     Problem: Discharge Barriers/Planning  Goal: Patient’s continuum of care needs will be met  Pt and family aware and in agreement of transfer.

## 2017-03-01 NOTE — CONSULTS
[facility] here. Pt dressed. Assisted to WC. Pt off premise with [facility] transport.

## 2017-03-02 LAB
BACTERIA BLD CULT: NORMAL
BACTERIA BLD CULT: NORMAL
SIGNIFICANT IND 70042: NORMAL
SIGNIFICANT IND 70042: NORMAL
SITE SITE: NORMAL
SITE SITE: NORMAL
SOURCE SOURCE: NORMAL
SOURCE SOURCE: NORMAL

## 2017-03-08 ENCOUNTER — TELEPHONE (OUTPATIENT)
Dept: NEUROLOGY | Facility: MEDICAL CENTER | Age: 72
End: 2017-03-08

## 2017-03-08 NOTE — TELEPHONE ENCOUNTER
Pt's sister is calling and stating that her sister missed her appointment due to being in the hospital. She is shopping around for a nursing home for her sister to be placed in. Pt's sister also had some additional questions and stated she would like a call back and LM if she does not answer. Called and LM for pt's sister with all the answers to her additional questions and concerns. TURENR

## 2017-03-10 ENCOUNTER — OFFICE VISIT (OUTPATIENT)
Dept: NEUROLOGY | Facility: MEDICAL CENTER | Age: 72
End: 2017-03-10
Payer: MEDICARE

## 2017-03-10 VITALS
SYSTOLIC BLOOD PRESSURE: 100 MMHG | HEART RATE: 78 BPM | DIASTOLIC BLOOD PRESSURE: 64 MMHG | BODY MASS INDEX: 24.77 KG/M2 | TEMPERATURE: 98.2 F | OXYGEN SATURATION: 95 % | HEIGHT: 61 IN | RESPIRATION RATE: 18 BRPM | WEIGHT: 131.2 LBS

## 2017-03-10 DIAGNOSIS — F03.91 DEMENTIA WITH BEHAVIORAL DISTURBANCE, UNSPECIFIED DEMENTIA TYPE: ICD-10-CM

## 2017-03-10 DIAGNOSIS — R53.1 WEAKNESS: ICD-10-CM

## 2017-03-10 DIAGNOSIS — G20.A1 PARKINSON DISEASE: ICD-10-CM

## 2017-03-10 PROCEDURE — G8432 DEP SCR NOT DOC, RNG: HCPCS | Performed by: PSYCHIATRY & NEUROLOGY

## 2017-03-10 PROCEDURE — 1101F PT FALLS ASSESS-DOCD LE1/YR: CPT | Mod: 8P | Performed by: PSYCHIATRY & NEUROLOGY

## 2017-03-10 PROCEDURE — G8484 FLU IMMUNIZE NO ADMIN: HCPCS | Performed by: PSYCHIATRY & NEUROLOGY

## 2017-03-10 PROCEDURE — 1111F DSCHRG MED/CURRENT MED MERGE: CPT | Performed by: PSYCHIATRY & NEUROLOGY

## 2017-03-10 PROCEDURE — 3017F COLORECTAL CA SCREEN DOC REV: CPT | Performed by: PSYCHIATRY & NEUROLOGY

## 2017-03-10 PROCEDURE — 4040F PNEUMOC VAC/ADMIN/RCVD: CPT | Mod: 8P | Performed by: PSYCHIATRY & NEUROLOGY

## 2017-03-10 PROCEDURE — 99214 OFFICE O/P EST MOD 30 MIN: CPT | Performed by: PSYCHIATRY & NEUROLOGY

## 2017-03-10 PROCEDURE — 1036F TOBACCO NON-USER: CPT | Performed by: PSYCHIATRY & NEUROLOGY

## 2017-03-10 PROCEDURE — 3014F SCREEN MAMMO DOC REV: CPT | Mod: 8P | Performed by: PSYCHIATRY & NEUROLOGY

## 2017-03-10 PROCEDURE — G8420 CALC BMI NORM PARAMETERS: HCPCS | Performed by: PSYCHIATRY & NEUROLOGY

## 2017-03-10 RX ORDER — LEVETIRACETAM 250 MG/1
125 TABLET ORAL 2 TIMES DAILY
Qty: 30 TAB | Refills: 3 | Status: SHIPPED | OUTPATIENT
Start: 2017-03-10

## 2017-03-10 NOTE — MR AVS SNAPSHOT
"        Paola Brito   3/10/2017 3:40 PM   Office Visit   MRN: [id number]    Department:  [facility]   Dept Phone:  [phone]    Description:  Female : 1945   Provider:  Eliezer Harrell M.D.           Reason for Visit     Follow-Up Parkinson disease      Allergies as of 3/10/2017     Allergen Noted Reactions    Morphine 2016   Unspecified    Rxn - early 's   Family reports that she almost     Pcn [Penicillins] 2016   Unspecified    Rxn - unspecified      You were diagnosed with     Parkinson disease (CMS-HCC)   [336461]       Weakness   [567063]       Dementia with behavioral disturbance, unspecified dementia type   [4198260]         Vital Signs     Blood Pressure Pulse Temperature Respirations Height Weight    100/64 mmHg 78 36.8 °C (98.2 °F) 18 1.549 m (5' 0.98\") 59.512 kg (131 lb 3.2 oz)    Body Mass Index Oxygen Saturation Smoking Status             24.80 kg/m2 95% Former Smoker         Basic Information     Date Of Birth Sex Race Ethnicity Preferred Language    1945 Female Unable to Obtain Unknown English      Problem List              ICD-10-CM Priority Class Noted - Resolved    Parkinson disease (CMS-HCC) G20   2016 - Present    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, G20   2016 - Present    Spells R68.89   2016 - Present    Late onset Alzheimer's disease without behavioral disturbance G30.1, F02.80   2016 - Present    Weakness R53.1 High  2016 - Present    UTI (urinary tract infection) N39.0   2017 - Present    Parkinson's disease (CMS-HCC) G20   2017 - Present    Rhabdomyolysis M62.82   2017 - Present    Dementia F03.90   2017 - Present      Health Maintenance        Date Due Completion Dates    IMM DTaP/Tdap/Td Vaccine (1 - Tdap) 1965 ---    PAP SMEAR 1967 ---    MAMMOGRAM 1986 ---    COLONOSCOPY 1996 ---    IMM ZOSTER VACCINE 2006 ---    BONE DENSITY 2011 ---    IMM PNEUMOCOCCAL 65+ (ADULT) " LOW/MEDIUM RISK SERIES (1 of 2 - PCV13) 6/4/2011 ---    IMM INFLUENZA (1) 9/1/2016 ---            Current Immunizations     No immunizations on file.      Below and/or attached are the medications your provider expects you to take. Review all of your home medications and newly ordered medications with your provider and/or pharmacist. Follow medication instructions as directed by your provider and/or pharmacist. Please keep your medication list with you and share with your provider. Update the information when medications are discontinued, doses are changed, or new medications (including over-the-counter products) are added; and carry medication information at all times in the event of emergency situations     Allergies:  MORPHINE - Unspecified     PCN - Unspecified               Medications  Valid as of: March 10, 2017 -  4:47 PM    Generic Name Brand Name Tablet Size Instructions for use    Carbidopa-Levodopa (Tab) SINEMET  MG TAKE 1 TAB BY MOUTH 3 TIMES A DAY.        Entacapone (Tab) COMTAN 200 MG Take 200 mg by mouth 3 times a day.        LevETIRAcetam (Tab) KEPPRA 250 MG Take 0.5 Tabs by mouth 2 times a day.        Pramipexole Dihydrochloride (Tab) Pramipexole Dihydrochloride 0.75 MG TAKE 1 TAB BY MOUTH 3 TIMES A DAY.        Sertraline HCl (Tab) ZOLOFT 50 MG Take 50 mg by mouth every day.        Spironolactone (Tab) ALDACTONE 25 MG Take 25 mg by mouth every day.        .                 Medicines prescribed today were sent to:     [id number] - [place] - [address]    [address] [place] [address]    Phone: [phone] Fax: [phone]    Open 24 Hours?: No      Medication refill instructions:       If your prescription bottle indicates you have medication refills left, it is not necessary to call your provider’s office. Please contact your pharmacy and they will refill your medication.    If your prescription bottle indicates you do not have any refills left, you may request refills  at any time through one of the following ways: The online [employer] Party system (except Urgent Care), by calling your provider’s office, or by asking your pharmacy to contact your provider’s office with a refill request. Medication refills are processed only during regular business hours and may not be available until the next business day. Your provider may request additional information or to have a follow-up visit with you prior to refilling your medication.   *Please Note: Medication refills are assigned a new Rx number when refilled electronically. Your pharmacy may indicate that no refills were authorized even though a new prescription for the same medication is available at the pharmacy. Please request the medicine by name with the pharmacy before contacting your provider for a refill.        Instructions      Recall 0/3  Sister is the major caregiver    IMPRESSION:    1. Parkinson Disease Stage IV--- diagnosed in [place] in 2002  2. Dyskinesia-- Diphasic Dyskinesia? Due to 1 for 4 years  3. One episode of yelling and confusion , ended in hospital--July 2016  4. Deterioration of cognition in the past 6 months  5. Unsteady gait--- falls-- frontal lobe dysfunction  6. Weight loss in the past 5 years  7. Advance directive already decided-- no intubation, no tubes, DNR, DNI---her sister is her POA    PLAN/RECOMMENDATIONS:      Continued to fall--one -two falls per week  Sundowning phenomenon-- confusion  Weight loss in the past 5 years  ________________________________________________________________________      Continue Sinemet  Continue Mirapex (Pramipexole) and Comtan (Entacapone)    Advise   1. Multiple Vitamin Daily   2. Vit D3 2000 unit daily   3. Fish Oil Omega 3 1000mg/cap 3# daily    This time, we will try Keppra 125mg before sleep for 2 weeks, if no side effects, up Keppra to 125mg two times per day ---  If any side effects, please let us know, if not effective in confusion reduction, please let us know too  The  goal of keppra is to reduce confusion    Advise nursing home or assisted living placement-- to avoid the falls  Agree with DNR , DNI, no feeding tubes     We will see Paola in 4 months    ________________________________________________________________________  SIGNATURE:  Eliezer Harrell      INTERPRETATION:  This EEG denotes of mild cortical dysfunction over the    temporal region; however, this abnormality remained nonspecific.  Clinical    correlation may help.       ____________________________________     MD NANCY IBRAHIM / ALISIA    DD:  10/02/2016 13:29:52  DT:  10/02/2016 13:47:04    D#:  [id number]  Job#:  [id number]                    Stereomood Access Code: [email or web address]  Expires: 3/11/2017  2:38 PM    Stereomood  A secure, online tool to manage your health information     [employer]s Stereomood® is a secure, online tool that connects you to your personalized health information from the privacy of your home -- day or night - making it very easy for you to manage your healthcare. Once the activation process is completed, you can even access your medical information using the Stereomood justino, which is available for free in the Apple Justino store or Google Play store.     Stereomood provides the following levels of access (as shown below):   My Chart Features   [facility] Primary [facility] Doctor [facility]  Specialists [facility]  Urgent  Care Non-[facility]  Primary Care  Doctor   Email your healthcare team securely and privately 24/7 X X X    Manage appointments: schedule your next appointment; view details of past/upcoming appointments X      Request prescription refills. X      View recent personal medical records, including lab and immunizations X X X X   View health record, including health history, allergies, medications X X X X   Read reports about your outpatient visits, procedures, consult and ER notes X X X X   See your discharge summary, which is a recap of your hospital and/or ER visit that includes your diagnosis, lab results, and  care plan. X X       How to register for Revver:  1. Go to  [email or web address].  2. Click on the Sign Up Now box, which takes you to the New Member Sign Up page. You will need to provide the following information:  a. Enter your Revver Access Code exactly as it appears at the top of this page. (You will not need to use this code after you’ve completed the sign-up process. If you do not sign up before the expiration date, you must request a new code.)   b. Enter your date of birth.   c. Enter your home email address.   d. Click Submit, and follow the next screen’s instructions.  3. Create a Revver ID. This will be your Revver login ID and cannot be changed, so think of one that is secure and easy to remember.  4. Create a Plananat password. You can change your password at any time.  5. Enter your Password Reset Question and Answer. This can be used at a later time if you forget your password.   6. Enter your e-mail address. This allows you to receive e-mail notifications when new information is available in Revver.  7. Click Sign Up. You can now view your health information.    For assistance activating your Revver account, call [phone]

## 2017-03-11 NOTE — PROGRESS NOTES
"NEUROLOGY NOTE    Referring Physician  Self referral      CHIEF COMPLAINT:  Parkinson disease diagnosed since 2002-- used to live in [facility]--  Chief Complaint   Patient presents with   • Follow-Up     Parkinson disease       PRESENT ILLNESS:   Parkinson disease diagnosed since 2002-- used to live in [facility]--  Since she retired, she moved back to [place] for some nursing care    She is having lots of dyskinesia-- biphase-- peak dose and before next dose    This is a 71-year-old female presenting with    dyspnea on exertion after \"yelling at her dog\" after dinner tonight.  The    episode lasted for the last 20-30 minutes.  She does not have any chest pain,    cough, fevers, chills, or any other sickness noted.  She does state that she    did have some racing heart and possibly palpitations felt at that time.  She    does have left lower extremity swelling and states that she felt very weak.     She has never had a history of cough, chest pain before.      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     Social History Main Topics   • Smoking status: Former Smoker     Types: Cigarettes   • Smokeless tobacco: Not on file   • Alcohol Use: Yes    "   Comment: a couple glasses a couple times per week   • Drug Use: No   • Sexual Activity: Not on file     Other Topics Concern   • Not on file     Social History Narrative     ALLERGIES:  Allergies   Allergen Reactions   • Morphine Unspecified     Rxn - early    Family reports that she almost    • Pcn [Penicillins] Unspecified     Rxn - unspecified     TOBHX  History   Smoking status   • Former Smoker   • Types: Cigarettes   Smokeless tobacco   • Not on file     ALCHX  History   Alcohol Use   • Yes     Comment: a couple glasses a couple times per week     DRUGHX  History   Drug Use No           MEDICATIONS:  Current Outpatient Prescriptions   Medication   • carbidopa-levodopa (SINEMET)  MG Tab   • entacapone (COMTAN) 200 MG Tab   • Pramipexole Dihydrochloride 0.75 MG Tab   • sertraline (ZOLOFT) 50 MG Tab   • spironolactone (ALDACTONE) 25 MG Tab     No current facility-administered medications for this visit.       REVIEW OF SYSTEM:    Constitutional: Denies fevers, Denies weight changes   Eyes: Denies changes in vision, no eye pain   Ears/Nose/Throat/Mouth: Denies nasal congestion or sore throat   Cardiovascular: HTN  Respiratory: Denies SOB.   Gastrointestinal/Hepatic: Denies abdominal pain, nausea, vomiting, diarrhea, constipation or GI bleeding   Genitourinary: Denies bladder dysfunction, dysuria or frequency   Musculoskeletal/Rheum: Denies joint pain and swelling   Skin/Breast: Denies rash, denies breast lumps or discharge   Neurological: Parkinson Disease since -- dyskinesia   Psychiatric: denies mood disorder   Endocrine: denies hx of diabetes or thyroid dysfunction   Heme/Oncology/Lymph Nodes: Denies enlarged lymph nodes, denies brusing or known bleeding disorder   Allergic/Immunologic: Denies hx of allergies   All other systems were reviewed and are negative (AMA/CMS criteria)       PHYSICAL AND NEUROLOGICAL EXMAINATIONS:  VITAL SIGNS: /64 mmHg  Pulse 78  Temp(Src) 36.8 °C (98.2  "°F)  Resp 18  Ht 1.549 m (5' 0.98\")  Wt 59.512 kg (131 lb 3.2 oz)  BMI 24.80 kg/m2  SpO2 95%  CURRENT WEIGHT:   BMI: Body mass index is 24.8 kg/(m^2).  PREVIOUS WEIGHTS:  Wt Readings from Last 25 Encounters:   03/10/17 59.512 kg (131 lb 3.2 oz)   02/25/17 62.4 kg (137 lb 9.1 oz)   11/21/16 62 kg (136 lb 11 oz)   11/08/16 61.689 kg (136 lb)   08/09/16 62.143 kg (137 lb)   07/07/16 61.5 kg (135 lb 9.3 oz)   05/02/16 60.782 kg (134 lb)   02/01/16 59.421 kg (131 lb)       General appearance of patient: WDWN(+) NAD(+)    EYES  o Fundus : Papilledem(-) Exudates(-) Hemorrhage(-)  Nervous System  Orientation to time, place and person(+)  Memory normal(-) recall 2/3 today  Language: aphasia(-)  Knowledge: past(+) Current(+)  Attention(+)  Cranial Nerves  • Nerve 2: intact  • Nerve 3,4,6: intact  • Nerve 5 : intact  • Nerve 7: intact  • Nerve 8: intact  • Nerve 9 & 10: intact  • Nerve 11: intact  • Nerve 12: intact  Muscle Power and muscle tone: symmetric, normal in upper and lower  Sensory System: Pin sensation intact(+)  Reflexes: symmetric throughout  Cerebellar Function FNP normal   Gait : Steady(-) TandemGait steady(-)  Heart and Vascular  Peripheral Vasucular system : Edema (-) Swelling(-)  RHB, Breathing sound clear  abdomen bowel sound normoactive  Extremities freely moveable  Joints no contracture     Continuous chorea, dystonia and dyskinesa during this interview  Bradykinesia (+)  Rigidity(+)  Postural balance -- borderline  Resting tremor -- not prominent now    Recall 0/3  Sister is the major caregiver    IMPRESSION:    1. Parkinson Disease Stage IV--- diagnosed in [place] in 2002  2. Dyskinesia-- Diphasic Dyskinesia? Due to 1 for 4 years  3. One episode of yelling and confusion , ended in hospital--July 2016  4. Deterioration of cognition in the past 6 months  5. Unsteady gait--- falls-- frontal lobe dysfunction  6. Weight loss in the past 5 years  7. Advance directive already decided-- no intubation, no tubes, " DNR, DNI---her sister is her POA    PLAN/RECOMMENDATIONS:      Continued to fall--one -two falls per week  Sundowning phenomenon-- confusion  Weight loss in the past 5 years  ________________________________________________________________________      Continue Sinemet  Continue Mirapex (Pramipexole) and Comtan (Entacapone)    Advise   1. Multiple Vitamin Daily   2. Vit D3 2000 unit daily   3. Fish Oil Omega 3 1000mg/cap 3# daily    This time, we will try Keppra 125mg before sleep for 2 weeks, if no side effects, up Keppra to 125mg two times per day ---  If any side effects, please let us know, if not effective in confusion reduction, please let us know too  The goal of keppra is to reduce confusion    Advise nursing home or assisted living placement-- to avoid the falls  Agree with DNR , DNI, no feeding tubes     We will see Paola in 4 months    ________________________________________________________________________      SIGNATURE:  Eliezer Harrell      INTERPRETATION:  This EEG denotes of mild cortical dysfunction over the    temporal region; however, this abnormality remained nonspecific.  Clinical    correlation may help.       ____________________________________     MD NANCY IBRAHIM / ALISIA    DD:  10/02/2016 13:29:52  DT:  10/02/2016 13:47:04    D#:  [id number]  Job#:  [id number]

## 2017-03-11 NOTE — PATIENT INSTRUCTIONS
Recall 0/3  Sister is the major caregiver    IMPRESSION:    1. Parkinson Disease Stage IV--- diagnosed in [place] in 2002  2. Dyskinesia-- Diphasic Dyskinesia? Due to 1 for 4 years  3. One episode of yelling and confusion , ended in hospital--July 2016  4. Deterioration of cognition in the past 6 months  5. Unsteady gait--- falls-- frontal lobe dysfunction  6. Weight loss in the past 5 years  7. Advance directive already decided-- no intubation, no tubes, DNR, DNI---her sister is her POA    PLAN/RECOMMENDATIONS:      Continued to fall--one -two falls per week  Sundowning phenomenon-- confusion  Weight loss in the past 5 years  ________________________________________________________________________      Continue Sinemet  Continue Mirapex (Pramipexole) and Comtan (Entacapone)    Advise   1. Multiple Vitamin Daily   2. Vit D3 2000 unit daily   3. Fish Oil Omega 3 1000mg/cap 3# daily    This time, we will try Keppra 125mg before sleep for 2 weeks, if no side effects, up Keppra to 125mg two times per day ---  If any side effects, please let us know, if not effective in confusion reduction, please let us know too  The goal of keppra is to reduce confusion    Advise nursing home or assisted living placement-- to avoid the falls  Agree with DNR , DNI, no feeding tubes     We will see Paola in 4 months    ________________________________________________________________________  SIGNATURE:  Eliezer Harrell      INTERPRETATION:  This EEG denotes of mild cortical dysfunction over the    temporal region; however, this abnormality remained nonspecific.  Clinical    correlation may help.       ____________________________________     MD NANCY IBRAHIM    DD:  10/02/2016 13:29:52  DT:  10/02/2016 13:47:04    D#:  [id number]  Job#:  [id number]

## 2017-03-15 ENCOUNTER — TELEPHONE (OUTPATIENT)
Dept: NEUROLOGY | Facility: MEDICAL CENTER | Age: 72
End: 2017-03-15

## 2017-03-15 NOTE — TELEPHONE ENCOUNTER
Called and LM for pt's sister to find out where she wants the [employer] paper mailed to or if she wants to stop by the clinic and pick it up. KA

## 2017-03-25 ENCOUNTER — APPOINTMENT (OUTPATIENT)
Dept: RADIOLOGY | Facility: MEDICAL CENTER | Age: 72
End: 2017-03-25
Attending: EMERGENCY MEDICINE
Payer: MEDICARE

## 2017-03-25 ENCOUNTER — HOSPITAL ENCOUNTER (EMERGENCY)
Facility: MEDICAL CENTER | Age: 72
End: 2017-03-25
Attending: EMERGENCY MEDICINE
Payer: MEDICARE

## 2017-03-25 VITALS
DIASTOLIC BLOOD PRESSURE: 53 MMHG | SYSTOLIC BLOOD PRESSURE: 110 MMHG | HEART RATE: 44 BPM | RESPIRATION RATE: 18 BRPM | HEIGHT: 66 IN | BODY MASS INDEX: 21.86 KG/M2 | OXYGEN SATURATION: 97 % | TEMPERATURE: 99 F | WEIGHT: 136 LBS

## 2017-03-25 DIAGNOSIS — R00.1 SINUS BRADYCARDIA: ICD-10-CM

## 2017-03-25 DIAGNOSIS — R46.89 BEHAVIORAL CHANGE: ICD-10-CM

## 2017-03-25 DIAGNOSIS — N30.00 ACUTE CYSTITIS WITHOUT HEMATURIA: ICD-10-CM

## 2017-03-25 LAB
ANION GAP SERPL CALC-SCNC: 6 MMOL/L (ref 0–11.9)
APPEARANCE UR: CLEAR
BACTERIA #/AREA URNS HPF: ABNORMAL /HPF
BASOPHILS # BLD AUTO: 0.7 % (ref 0–1.8)
BASOPHILS # BLD: 0.04 K/UL (ref 0–0.12)
BILIRUB UR QL STRIP.AUTO: NEGATIVE
BUN SERPL-MCNC: 13 MG/DL (ref 8–22)
CALCIUM SERPL-MCNC: 9 MG/DL (ref 8.4–10.2)
CHLORIDE SERPL-SCNC: 106 MMOL/L (ref 96–112)
CO2 SERPL-SCNC: 27 MMOL/L (ref 20–33)
COLOR UR: YELLOW
CREAT SERPL-MCNC: 0.61 MG/DL (ref 0.5–1.4)
CULTURE IF INDICATED INDCX: YES UA CULTURE
EOSINOPHIL # BLD AUTO: 0.11 K/UL (ref 0–0.51)
EOSINOPHIL NFR BLD: 1.8 % (ref 0–6.9)
EPI CELLS #/AREA URNS HPF: ABNORMAL /HPF
ERYTHROCYTE [DISTWIDTH] IN BLOOD BY AUTOMATED COUNT: 41.2 FL (ref 35.9–50)
GFR SERPL CREATININE-BSD FRML MDRD: >60 ML/MIN/1.73 M 2
GLUCOSE SERPL-MCNC: 91 MG/DL (ref 65–99)
GLUCOSE UR STRIP.AUTO-MCNC: 100 MG/DL
HCT VFR BLD AUTO: 37.5 % (ref 37–47)
HGB BLD-MCNC: 12.5 G/DL (ref 12–16)
IMM GRANULOCYTES # BLD AUTO: 0.02 K/UL (ref 0–0.11)
IMM GRANULOCYTES NFR BLD AUTO: 0.3 % (ref 0–0.9)
KETONES UR STRIP.AUTO-MCNC: ABNORMAL MG/DL
LEUKOCYTE ESTERASE UR QL STRIP.AUTO: NEGATIVE
LYMPHOCYTES # BLD AUTO: 2.41 K/UL (ref 1–4.8)
LYMPHOCYTES NFR BLD: 40.3 % (ref 22–41)
MCH RBC QN AUTO: 29.4 PG (ref 27–33)
MCHC RBC AUTO-ENTMCNC: 33.3 G/DL (ref 33.6–35)
MCV RBC AUTO: 88.2 FL (ref 81.4–97.8)
MICRO URNS: ABNORMAL
MONOCYTES # BLD AUTO: 0.49 K/UL (ref 0–0.85)
MONOCYTES NFR BLD AUTO: 8.2 % (ref 0–13.4)
MUCOUS THREADS #/AREA URNS HPF: ABNORMAL /HPF
NEUTROPHILS # BLD AUTO: 2.91 K/UL (ref 2–7.15)
NEUTROPHILS NFR BLD: 48.7 % (ref 44–72)
NITRITE UR QL STRIP.AUTO: POSITIVE
NRBC # BLD AUTO: 0 K/UL
NRBC BLD AUTO-RTO: 0 /100 WBC
PH UR STRIP.AUTO: 5.5 [PH]
PLATELET # BLD AUTO: 210 K/UL (ref 164–446)
PMV BLD AUTO: 10.4 FL (ref 9–12.9)
POTASSIUM SERPL-SCNC: 3.1 MMOL/L (ref 3.6–5.5)
PROT UR QL STRIP: NEGATIVE MG/DL
RBC # BLD AUTO: 4.25 M/UL (ref 4.2–5.4)
RBC UR QL AUTO: NEGATIVE
SODIUM SERPL-SCNC: 139 MMOL/L (ref 135–145)
SP GR UR STRIP.AUTO: 1.02
WBC # BLD AUTO: 6 K/UL (ref 4.8–10.8)
WBC #/AREA URNS HPF: ABNORMAL /HPF

## 2017-03-25 PROCEDURE — 87086 URINE CULTURE/COLONY COUNT: CPT

## 2017-03-25 PROCEDURE — 87186 SC STD MICRODIL/AGAR DIL: CPT

## 2017-03-25 PROCEDURE — 81001 URINALYSIS AUTO W/SCOPE: CPT

## 2017-03-25 PROCEDURE — 99284 EMERGENCY DEPT VISIT MOD MDM: CPT

## 2017-03-25 PROCEDURE — A9270 NON-COVERED ITEM OR SERVICE: HCPCS | Performed by: EMERGENCY MEDICINE

## 2017-03-25 PROCEDURE — 51701 INSERT BLADDER CATHETER: CPT

## 2017-03-25 PROCEDURE — 85025 COMPLETE CBC W/AUTO DIFF WBC: CPT

## 2017-03-25 PROCEDURE — 70450 CT HEAD/BRAIN W/O DYE: CPT

## 2017-03-25 PROCEDURE — 700102 HCHG RX REV CODE 250 W/ 637 OVERRIDE(OP): Performed by: EMERGENCY MEDICINE

## 2017-03-25 PROCEDURE — 303105 HCHG CATHETER EXTRA

## 2017-03-25 PROCEDURE — 80048 BASIC METABOLIC PNL TOTAL CA: CPT

## 2017-03-25 PROCEDURE — 87077 CULTURE AEROBIC IDENTIFY: CPT

## 2017-03-25 PROCEDURE — 51702 INSERT TEMP BLADDER CATH: CPT

## 2017-03-25 RX ORDER — NITROFURANTOIN MACROCRYSTALS 50 MG/1
100 CAPSULE ORAL ONCE
Status: COMPLETED | OUTPATIENT
Start: 2017-03-25 | End: 2017-03-25

## 2017-03-25 RX ORDER — M-VIT,TX,IRON,MINS/CALC/FOLIC 27MG-0.4MG
1 TABLET ORAL DAILY
COMMUNITY

## 2017-03-25 RX ORDER — NITROFURANTOIN 25; 75 MG/1; MG/1
100 CAPSULE ORAL 2 TIMES DAILY
Qty: 14 CAP | Refills: 0 | Status: SHIPPED | OUTPATIENT
Start: 2017-03-25 | End: 2017-04-01

## 2017-03-25 RX ORDER — CHLORAL HYDRATE 500 MG
1000 CAPSULE ORAL
COMMUNITY

## 2017-03-25 RX ADMIN — NITROFURANTOIN (MACROCRYSTALS) 100 MG: 50 CAPSULE ORAL at 03:54

## 2017-03-25 NOTE — ED AVS SNAPSHOT
3/25/2017          Paola Brito  [address] [place] [address]    Dear Paola:    [facility] wants to ensure your discharge home is safe and you or your loved ones have had all your questions answered regarding your care after you leave the hospital.    You may receive a telephone call within two days of your discharge.  This call is to make certain you understand your discharge instructions as well as ensure we provided you with the best care possible during your stay with us.     The call will only last approximately 3-5 minutes and will be done by a nurse.    Once again, we want to ensure your discharge home is safe and that you have a clear understanding of any next steps in your care.  If you have any questions or concerns, please do not hesitate to contact us, we are here for you.  Thank you for choosing [facility] for your healthcare needs.    Sincerely,    Leo Ziegler    [facility]

## 2017-03-25 NOTE — LETTER
3/29/2017               [place] [facility]  [address] [address]        Dear Paola (MR#[id number])    This letter is sent in regards to your, recent visit to the [facility] Emergency Department on 3/25/2017.  During the visit, tests were performed to assist the physician in a medical diagnosis.  A review of those tests requires that we notify you of the following:    Your urine culture was POSITIVE for a bacteria called Klebsiella pneumoniae. The antibiotic prescribed for you (nitrofurantoin) should be active to treat this bacteria. IT IS IMPORTANT THAT YOU CONTINUE TAKING YOUR ANTIBIOTIC UNTIL IT IS FINISHED.      Please feel free to contact me at the number below if you have any questions or concerns. Thank you for your cooperation in the matter.    Sincerely,  ED Culture Follow-Up Staff  Philomena Thakur, PharmD    [facility], Emergency Department  [address] [place] [id number]  [phone] (ED Culture Line)  [phone]

## 2017-03-25 NOTE — ED AVS SNAPSHOT
BigRoad Access Code: [email or web address]  Expires: 4/10/2017 11:38 AM    BigRoad  A secure, online tool to manage your health information     [employer]’s BigRoad® is a secure, online tool that connects you to your personalized health information from the privacy of your home -- day or night - making it very easy for you to manage your healthcare. Once the activation process is completed, you can even access your medical information using the BigRoad haris, which is available for free in the Apple Haris store or Google Play store.     BigRoad provides the following levels of access (as shown below):   My Chart Features   [facility] Primary Care Doctor [facility]  Specialists [facility]  Urgent  Care Non-[facility]  Primary Care  Doctor   Email your healthcare team securely and privately 24/7 X X X X   Manage appointments: schedule your next appointment; view details of past/upcoming appointments X      Request prescription refills. X      View recent personal medical records, including lab and immunizations X X X X   View health record, including health history, allergies, medications X X X X   Read reports about your outpatient visits, procedures, consult and ER notes X X X X   See your discharge summary, which is a recap of your hospital and/or ER visit that includes your diagnosis, lab results, and care plan. X X       How to register for BigRoad:  1. Go to  [email or web address].  2. Click on the Sign Up Now box, which takes you to the New Member Sign Up page. You will need to provide the following information:  a. Enter your BigRoad Access Code exactly as it appears at the top of this page. (You will not need to use this code after you’ve completed the sign-up process. If you do not sign up before the expiration date, you must request a new code.)   b. Enter your date of birth.   c. Enter your home email address.   d. Click Submit, and follow the next screen’s instructions.  3. Create a BigRoad ID. This will be your BigRoad  login ID and cannot be changed, so think of one that is secure and easy to remember.  4. Create a You Software password. You can change your password at any time.  5. Enter your Password Reset Question and Answer. This can be used at a later time if you forget your password.   6. Enter your e-mail address. This allows you to receive e-mail notifications when new information is available in You Software.  7. Click Sign Up. You can now view your health information.    For assistance activating your You Software account, call [phone]

## 2017-03-25 NOTE — DISCHARGE INSTRUCTIONS
Urinary Tract Infection  Urinary tract infections (UTIs) can develop anywhere along your urinary tract. Your urinary tract is your body's drainage system for removing wastes and extra water. Your urinary tract includes two kidneys, two ureters, a bladder, and a urethra. Your kidneys are a pair of bean-shaped organs. Each kidney is about the size of your fist. They are located below your ribs, one on each side of your spine.  CAUSES  Infections are caused by microbes, which are microscopic organisms, including fungi, viruses, and bacteria. These organisms are so small that they can only be seen through a microscope. Bacteria are the microbes that most commonly cause UTIs.  SYMPTOMS   Symptoms of UTIs may vary by age and gender of the patient and by the location of the infection. Symptoms in young women typically include a frequent and intense urge to urinate and a painful, burning feeling in the bladder or urethra during urination. Older women and men are more likely to be tired, shaky, and weak and have muscle aches and abdominal pain. A fever may mean the infection is in your kidneys. Other symptoms of a kidney infection include pain in your back or sides below the ribs, nausea, and vomiting.  DIAGNOSIS  To diagnose a UTI, your caregiver will ask you about your symptoms. Your caregiver also will ask to provide a urine sample. The urine sample will be tested for bacteria and white blood cells. White blood cells are made by your body to help fight infection.  TREATMENT   Typically, UTIs can be treated with medication. Because most UTIs are caused by a bacterial infection, they usually can be treated with the use of antibiotics. The choice of antibiotic and length of treatment depend on your symptoms and the type of bacteria causing your infection.  HOME CARE INSTRUCTIONS  · If you were prescribed antibiotics, take them exactly as your caregiver instructs you. Finish the medication even if you feel better after you  have only taken some of the medication.  · Drink enough water and fluids to keep your urine clear or pale yellow.  · Avoid caffeine, tea, and carbonated beverages. They tend to irritate your bladder.  · Empty your bladder often. Avoid holding urine for long periods of time.  · Empty your bladder before and after sexual intercourse.  · After a bowel movement, women should cleanse from front to back. Use each tissue only once.  SEEK MEDICAL CARE IF:   · You have back pain.  · You develop a fever.  · Your symptoms do not begin to resolve within 3 days.  SEEK IMMEDIATE MEDICAL CARE IF:   · You have severe back pain or lower abdominal pain.  · You develop chills.  · You have nausea or vomiting.  · You have continued burning or discomfort with urination.  MAKE SURE YOU:   · Understand these instructions.  · Will watch your condition.  · Will get help right away if you are not doing well or get worse.     This information is not intended to replace advice given to you by your health care provider. Make sure you discuss any questions you have with your health care provider.     Document Released: 09/27/2006 Document Revised: 01/08/2016 Document Reviewed: 01/25/2013  [employer] Patient Education ©2016 [employer]

## 2017-03-25 NOTE — ED AVS SNAPSHOT
Home Care Instructions                                                                                                                Paola Brito   MRN: [id number]    Department:  [facility], Emergency Dept   Date of Visit:  3/25/2017            [facility], Emergency Dept    [address]    [place] [address]    Phone:  [phone]      You were seen by     Shavonne Zacarias M.D.      Your Diagnosis Was     Acute cystitis without hematuria     N30.00       These are the medications you received during your hospitalization from 03/25/2017 0244 to 03/25/2017 0432     Date/Time Order Dose Route Action    03/25/2017 0354 nitrofurantoin (MACRODANTIN) capsule 100 mg 100 mg Oral Given      Follow-up Information     1. Schedule an appointment as soon as possible for a visit with Camila Malin M.D..    Specialty:  Family Medicine    Contact information    [address]  [place] [address]  [phone]          2. Follow up with [facility], Emergency Dept.    Specialty:  Emergency Medicine    Why:  If symptoms worsen - vomiting, not drinking well, sweating or unresponsiveness    Contact information    [address] [place] [id number] [phone]      Medication Information     Review all of your home medications and newly ordered medications with your primary doctor and/or pharmacist as soon as possible. Follow medication instructions as directed by your doctor and/or pharmacist.     Please keep your complete medication list with you and share with your physician. Update the information when medications are discontinued, doses are changed, or new medications (including over-the-counter products) are added; and carry medication information at all times in the event of emergency situations.               Medication List      START taking these medications        Instructions    Morning Afternoon Evening Bedtime    nitrofurantoin monohydr macro 100 MG Caps   Commonly known as:  MACROBID        Take 1 Cap by mouth 2 times a day for 7 days.   Dose:  100 mg                          ASK your doctor about these medications        Instructions    Morning Afternoon Evening Bedtime    carbidopa-levodopa  MG Tabs   Commonly known as:  SINEMET        TAKE 1 TAB BY MOUTH 3 TIMES A DAY.                        entacapone 200 MG Tabs   Commonly known as:  COMTAN        Take 200 mg by mouth 3 times a day.   Dose:  200 mg                        fish oil 1000 MG Caps capsule        Take 1,000 mg by mouth 3 times a day, with meals.   Dose:  1000 mg                        levetiracetam 250 MG tablet   Commonly known as:  KEPPRA        Doctor's comments:  Keppra 125mg before sleep for 2 weeks, then up to 125mg two times per day since the third week   Take 0.5 Tabs by mouth 2 times a day.   Dose:  125 mg                        nystatin Powd   Commonly known as:  MYCOSTATIN        Apply  to affected area(s) 3 times a day. Indications: Skin Infection due to Candida Yeast                        Pramipexole Dihydrochloride 0.75 MG Tabs        TAKE 1 TAB BY MOUTH 3 TIMES A DAY.                        sertraline 50 MG Tabs   Commonly known as:  ZOLOFT        Take 50 mg by mouth every day.   Dose:  50 mg                        spironolactone 25 MG Tabs   Commonly known as:  ALDACTONE        Take 25 mg by mouth every day.   Dose:  25 mg                        therapeutic multivitamin-minerals Tabs        Take 1 Tab by mouth every day.   Dose:  1 Tab                             Where to Get Your Medications      These medications were sent to [id number] - [place] - [address]  [address] [place] [address]     Phone:  [phone]    - nitrofurantoin monohydr macro 100 MG Caps            Procedures and tests performed during your visit     BASIC METABOLIC PANEL    CBC WITH DIFFERENTIAL    CT-HEAD W/O    ESTIMATED GFR     URINALYSIS CULTURE, IF INDICATED    URINE MICROSCOPIC (W/UA)        Discharge Instructions       Urinary Tract Infection  Urinary tract infections (UTIs) can develop anywhere along your urinary tract. Your urinary tract is your body's drainage system for removing wastes and extra water. Your urinary tract includes two kidneys, two ureters, a bladder, and a urethra. Your kidneys are a pair of bean-shaped organs. Each kidney is about the size of your fist. They are located below your ribs, one on each side of your spine.  CAUSES  Infections are caused by microbes, which are microscopic organisms, including fungi, viruses, and bacteria. These organisms are so small that they can only be seen through a microscope. Bacteria are the microbes that most commonly cause UTIs.  SYMPTOMS   Symptoms of UTIs may vary by age and gender of the patient and by the location of the infection. Symptoms in young women typically include a frequent and intense urge to urinate and a painful, burning feeling in the bladder or urethra during urination. Older women and men are more likely to be tired, shaky, and weak and have muscle aches and abdominal pain. A fever may mean the infection is in your kidneys. Other symptoms of a kidney infection include pain in your back or sides below the ribs, nausea, and vomiting.  DIAGNOSIS  To diagnose a UTI, your caregiver will ask you about your symptoms. Your caregiver also will ask to provide a urine sample. The urine sample will be tested for bacteria and white blood cells. White blood cells are made by your body to help fight infection.  TREATMENT   Typically, UTIs can be treated with medication. Because most UTIs are caused by a bacterial infection, they usually can be treated with the use of antibiotics. The choice of antibiotic and length of treatment depend on your symptoms and the type of bacteria causing your infection.  HOME CARE INSTRUCTIONS  · If you were prescribed antibiotics, take them  exactly as your caregiver instructs you. Finish the medication even if you feel better after you have only taken some of the medication.  · Drink enough water and fluids to keep your urine clear or pale yellow.  · Avoid caffeine, tea, and carbonated beverages. They tend to irritate your bladder.  · Empty your bladder often. Avoid holding urine for long periods of time.  · Empty your bladder before and after sexual intercourse.  · After a bowel movement, women should cleanse from front to back. Use each tissue only once.  SEEK MEDICAL CARE IF:   · You have back pain.  · You develop a fever.  · Your symptoms do not begin to resolve within 3 days.  SEEK IMMEDIATE MEDICAL CARE IF:   · You have severe back pain or lower abdominal pain.  · You develop chills.  · You have nausea or vomiting.  · You have continued burning or discomfort with urination.  MAKE SURE YOU:   · Understand these instructions.  · Will watch your condition.  · Will get help right away if you are not doing well or get worse.     This information is not intended to replace advice given to you by your health care provider. Make sure you discuss any questions you have with your health care provider.     Document Released: 09/27/2006 Document Revised: 01/08/2016 Document Reviewed: 01/25/2013  [employer] Interactive Patient Education ©2016 [employer]            Patient Information     Patient Information    Following emergency treatment: all patient requiring follow-up care must return either to a private physician or a clinic if your condition worsens before you are able to obtain further medical attention, please return to the emergency room.     Billing Information    At [facility], we work to make the billing process streamlined for our patients.  Our Representatives are here to answer any questions you may have regarding your hospital bill.  If you have insurance coverage and have supplied your insurance information to us, we will submit a claim  to your insurer on your behalf.  Should you have any questions regarding your bill, we can be reached online or by phone as follows:  Online: You are able pay your bills online or live chat with our representatives about any billing questions you may have. We are here to help Monday - Friday from 8:00am to 7:30pm and 9:00am - 12:00pm on Saturdays.  Please visit [email or web address]  for more information.   Phone:  [phone] or [phone]    Please note that your emergency physician, surgeon, pathologist, radiologist, anesthesiologist, and other specialists are not employed by [facility] and will therefore bill separately for their services.  Please contact them directly for any questions concerning their bills at the numbers below:     Emergency Physician Services:  [phone]  [facility]:  [phone]  [facility]:  [phone]  [facility]:  [phone]    1. Your final bill may vary from the amount quoted upon discharge if all procedures are not complete at that time, or if your doctor has additional procedures of which we are not aware. You will receive an additional bill if you return to the Emergency Department at [facility] for suture removal regardless of the facility of which the sutures were placed.     2. Please arrange for settlement of this account at the emergency registration.    3. All self-pay accounts are due in full at the time of treatment.  If you are unable to meet this obligation then payment is expected within 4-5 days.     4. If you have had radiology studies (CT, X-ray, Ultrasound, MRI), you have received a preliminary result during your emergency department visit. Please contact the radiology department [phone] to receive a copy of your final result. Please discuss the Final result with your primary physician or with the follow up physician provided.     Crisis Hotline:  National Crisis  Hotline:  8-317-BQTSXKD or 1-841.696.6250  [place] Crisis Hotline:    1-422.882.7171 or 779-375-8357         ED Discharge Follow Up Questions    1. In order to provide you with very good care, we would like to follow up with a phone call in the next few days.  May we have your permission to contact you?     YES /  NO    2. What is the best phone number to call you? (       )_____-__________    3. What is the best time to call you?      Morning  /  Afternoon  /  Evening                   Patient Signature:  ____________________________________________________________    Date:  ____________________________________________________________      Your appointments     Jul 07, 2017  3:20 PM   Follow Up Visit with Eliezer Harrell M.D.   [facility] (--)     [address], [address]  [facility] [address] [phone]           You will be receiving a confirmation call a few days before your appointment from our automated call confirmation system.

## 2017-03-25 NOTE — ED PROVIDER NOTES
ED Provider Note    CHIEF COMPLAINT  Chief Complaint   Patient presents with   • Tremors   • Weakness       HPI  Paola Brito is a 71 y.o. female who presents to the department via EMS for chief complaint of questionable seizures versus on body positioning. According to EMS as well as the head of the care facility throughout the last 2 days patient has had behavior in that she's been using profanity and being more verbally abusive which is not like her. Then this evening the patient was placing herself and very odd physical position such as leg straight and the air arms back and bone behind her body while still speaking and then complaining of pain. EMS states that they witnessed her have some mild shakes in her legs but during this time she continued to make eye contact and was answering questions.  Patient does have a history of both of dementia and Parkinson's disease, on her arrival to me she was able to spell her last name for me and move all of her extremities on command and has been quiet and an active pertussis. Though cannot get a great history from her. According to her medication list she is on low-dose Keppra though I am unsure as to why she is on it and the caretaker is also unsure why she is on it was started by a physician prior to arrival at the care facility which was 1 and half weeks ago.    Per the patient's pulse form she is a DNR with very limited measures but that does include IV laboratory analysis antibiotics    REVIEW OF SYSTEMS  Limited secondary to dementia    PAST MEDICAL HISTORY   has a past medical history of Parkinson's disease (CMS-[facility]); Urinary incontinence; and Hypertension.    SOCIAL HISTORY  Social History     Social History Main Topics   • Smoking status: Former Smoker     Types: Cigarettes   • Smokeless tobacco: Not on file   • Alcohol Use: Yes      Comment: a couple glasses a couple times per week   • Drug Use: No   • Sexual Activity: Not on file       SURGICAL HISTORY   has past  surgical history that includes tonsillectomy (N/A).    CURRENT MEDICATIONS  Home Medications     **Home medications have not yet been reviewed for this encounter**          ALLERGIES  Allergies   Allergen Reactions   • Morphine Unspecified     Rxn - early    Family reports that she almost    • Pcn [Penicillins] Unspecified     Rxn - unspecified       PHYSICAL EXAM  VITAL SIGNS: /61 mmHg  Pulse 50  Temp(Src) 37.2 °C (99 °F)  Resp 18  SpO2 97%   Pulse ox interpretation: I interpret this pulse ox as normal.  Constitutional: Alert in no apparent distress.  HENT: No signs of trauma, Bilateral external ears normal, Nose normal.   Eyes: Pupils are equal and reactive, Conjunctiva normal, Non-icteric.   Neck: Normal range of motion, No tenderness, Supple, No stridor.   Lymphatic: No lymphadenopathy noted.   Cardiovascular: Regular rate and rhythm, no murmurs.   Thorax & Lungs: Normal breath sounds, No respiratory distress, No wheezing, No chest tenderness.   Abdomen: Bowel sounds normal, Soft, No tenderness, No masses, No pulsatile masses. No peritoneal signs.  Skin: Warm, Dry, No erythema, No rash.   Back: No bony tenderness, No CVA tenderness.   Extremities: Intact distal pulses, No edema, No tenderness, No cyanosis,  Negative Sherry's sign.   Musculoskeletal: Good range of motion in all major joints. No tenderness to palpation or major deformities noted.   Neurologic: Alert , moving oximetries on command spelling her last name but otherwise cannot assess orientation Normal motor function, Normal sensory function, No focal deficits noted.       DIFFERENTIAL DIAGNOSIS AND WORK UP PLAN    This is a 71 y.o. female who presents with behavior change and questionable posturing though with a history of Parkinson's and not sure what to make of this. Per EMS history with the movement patient was still speaking making eye contact as I doubt that it was a tonic-clonic seizure possible partial seizure however no  "history of trauma with normal vital signs are differential for this includes infection or electrolyte abnormality or possible large parenchymal hemorrhage though again I doubt this. According to Her pulsed formal perform laboratory analysis, CT scan of the head while waiting for the patient's sister's arrival. Currently she is calm and cooperative with normal vital signs    DIAGNOSTIC STUDIES / PROCEDURES    LABS  Pertinent Lab Findings     urinalysis positive for infection, after reviewing the patient's last culture one month ago she was mostly pansensitive to antibiotics including Macrobid  Otherwise CBC and BMP within normal limits except for mildly low potassium    RADIOLOGY  CT-HEAD W/O   Final Result         1.  No acute intracranial abnormality is identified, there are changes of small vessel ischemic disease with mild atrophy noted.   2.  Atherosclerosis.        The radiologist's interpretation of all radiological studies have been reviewed by me.      COURSE & MEDICAL DECISION MAKING  Pertinent Labs & Imaging studies reviewed. (See chart for details)    3:36 AM  Spoke w the patients sister regarding diagnosis of UTI with the last urine culture being mostly pan sensitive - with vital signs otherwise still be on mild bradycardia and otherwise normal laboratory findings I discussed with her and sending patient back to her nursing facility with an antibiotic prescription she understands and agrees with this plan as well as the head CT is within normal limits    Will start the patient on macrobid as at this time I have low concern for pyelonephritis in the setting of otherwise normal labs and vital signs - the patients sister was given strict return precautions which include but are not limited to worsening behavior changes, somnolence nausea vomiting or decreased by mouth despite these antibiotic use.  /53 mmHg  Pulse 46  Temp(Src) 37.2 °C (99 °F)  Resp 18  Ht 1.676 m (5' 5.98\")  Wt 61.689 kg (136 " lb)  BMI 21.96 kg/m2  SpO2 96%      FOLLOW UP    Camila Malin M.D.  [address]  [place] [address]  [phone]    Schedule an appointment as soon as possible for a visit      [facility], Emergency Dept  [address]  [place] [id number] [phone]    If symptoms worsen - vomiting, not drinking well, sweating or unresponsiveness        FINAL IMPRESSION  1. Acute cystitis without hematuria    2. Behavioral change    3. Sinus bradycardia          Electronically signed by: Shavonne Zacarias, 3/25/2017 2:51 AM    This dictation has been created using voice recognition software and/or scribes. The accuracy of the dictation is limited by the abilities of the software and the expertise of the scribes. I expect there may be some errors of grammar and possibly content. I made every attempt to manually correct the errors within my dictation. However, errors related to voice recognition software and/or scribes may still exist and should be interpreted within the appropriate context.

## 2017-03-27 LAB
BACTERIA UR CULT: ABNORMAL
BACTERIA UR CULT: ABNORMAL
SIGNIFICANT IND 70042: ABNORMAL
SITE SITE: ABNORMAL
SOURCE SOURCE: ABNORMAL

## 2017-03-28 NOTE — PROGRESS NOTES
ED Positive Culture Follow-up/Notification Note:    Date: 3/28/17     Patient seen in the ED on 3/25/2017 for tremors and weakness. There was question of seizure as well based on body positioning.  Patient has a history of dementia and Parkinson's disease  1. Acute cystitis without hematuria    2. Behavioral change    3. Sinus bradycardia       Discharge Medication List as of 3/25/2017  4:32 AM      START taking these medications    Details   nitrofurantoin monohydr macro (MACROBID) 100 MG Cap Take 1 Cap by mouth 2 times a day for 7 days., Disp-14 Cap, R-0, Normal             Allergies: Morphine and Pcn     Final cultures:   Results     Procedure Component Value Units Date/Time    URINE CULTURE(NEW) [[id number]]  (Abnormal)  (Susceptibility) Collected:  03/25/17 0305    Order Status:  Completed Specimen Information:  Urine Updated:  03/27/17 0901     Significant Indicator POS (POS)      Source UR      Site --      Urine Culture -- (A)      Urine Culture -- (A)      Result:        Klebsiella pneumoniae  >100,000 cfu/mL      Narrative:      TEST Urine Culture WAS CANCELLED, 03/25/17 06:46 HIS# [id number]    Culture & Susceptibility     KLEBSIELLA PNEUMONIAE     Antibiotic Sensitivity Microscan Unit Status    Ampicillin Resistant >16 mcg/mL Final    Cefepime Sensitive <=8 mcg/mL Final    Cefotaxime Sensitive <=2 mcg/mL Final    Cefotetan Sensitive <=16 mcg/mL Final    Ceftazidime Sensitive <=1 mcg/mL Final    Ceftriaxone Sensitive <=8 mcg/mL Final    Cefuroxime Sensitive 8 mcg/mL Final    Cephalothin Sensitive <=8 mcg/mL Final    Ciprofloxacin Sensitive <=1 mcg/mL Final    Gentamicin Sensitive <=4 mcg/mL Final    Levofloxacin Sensitive <=2 mcg/mL Final    Nitrofurantoin Sensitive <=32 mcg/mL Final    Pip/Tazobactam Sensitive <=16 mcg/mL Final    Piperacillin Resistant >64 mcg/mL Final    Tigecycline Sensitive <=2 mcg/mL Final    Tobramycin Sensitive <=4 mcg/mL Final    Trimeth/Sulfa Sensitive <=2/38 mcg/mL Final                        URINALYSIS CULTURE, IF INDICATED [[id number]]  (Abnormal) Collected:  03/25/17 0305    Order Status:  Completed Specimen Information:  Other from Urine, Clean Catch Updated:  03/25/17 0327     Micro Urine Req Microscopic      Color Yellow      Character Clear      Specific Gravity 1.025      Ph 5.5      Glucose 100 (A) mg/dL      Ketones Trace (A) mg/dL      Protein Negative mg/dL      Bilirubin Negative      Nitrite Positive (A)      Leukocyte Esterase Negative      Occult Blood Negative      Culture Indicated Yes UA Culture     URINE CULTURE(NEW) [[id number]]     Order Status:  Canceled           Plan:   Appropriate antibiotic therapy prescribed. No changes required based upon culture result.  Sent letter to patient to notify of positive culture result and encourage compliance with prescribed antibiotics.     Philomena Thakur

## 2017-08-26 ENCOUNTER — HOSPITAL ENCOUNTER (EMERGENCY)
Facility: MEDICAL CENTER | Age: 72
End: 2017-08-26
Attending: EMERGENCY MEDICINE
Payer: MEDICARE

## 2017-08-26 ENCOUNTER — APPOINTMENT (OUTPATIENT)
Dept: RADIOLOGY | Facility: MEDICAL CENTER | Age: 72
End: 2017-08-26
Attending: EMERGENCY MEDICINE
Payer: MEDICARE

## 2017-08-26 VITALS
BODY MASS INDEX: 19.26 KG/M2 | DIASTOLIC BLOOD PRESSURE: 79 MMHG | TEMPERATURE: 100.4 F | OXYGEN SATURATION: 100 % | SYSTOLIC BLOOD PRESSURE: 141 MMHG | RESPIRATION RATE: 17 BRPM | WEIGHT: 119.27 LBS | HEART RATE: 90 BPM

## 2017-08-26 DIAGNOSIS — N39.0 ACUTE UTI: ICD-10-CM

## 2017-08-26 DIAGNOSIS — S01.81XA LACERATION OF FOREHEAD, INITIAL ENCOUNTER: ICD-10-CM
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
## 2017-08-26 DIAGNOSIS — R53.1 WEAKNESS: ICD-10-CM

## 2017-08-26 LAB
ALBUMIN SERPL BCP-MCNC: 3.5 G/DL (ref 3.2–4.9)
ALBUMIN/GLOB SERPL: 1.3 G/DL
ALP SERPL-CCNC: 94 U/L (ref 30–99)
ALT SERPL-CCNC: 7 U/L (ref 2–50)
ANION GAP SERPL CALC-SCNC: 8 MMOL/L (ref 0–11.9)
APPEARANCE UR: ABNORMAL
AST SERPL-CCNC: 16 U/L (ref 12–45)
BACTERIA #/AREA URNS HPF: ABNORMAL /HPF
BASOPHILS # BLD AUTO: 0.5 % (ref 0–1.8)
BASOPHILS # BLD: 0.05 K/UL (ref 0–0.12)
BILIRUB SERPL-MCNC: 0.7 MG/DL (ref 0.1–1.5)
BILIRUB UR QL STRIP.AUTO: NEGATIVE
BUN SERPL-MCNC: 15 MG/DL (ref 8–22)
CALCIUM SERPL-MCNC: 8.9 MG/DL (ref 8.4–10.2)
CHLORIDE SERPL-SCNC: 104 MMOL/L (ref 96–112)
CO2 SERPL-SCNC: 24 MMOL/L (ref 20–33)
COLOR UR: YELLOW
CREAT SERPL-MCNC: 0.64 MG/DL (ref 0.5–1.4)
EKG IMPRESSION: NORMAL
EOSINOPHIL # BLD AUTO: 0.09 K/UL (ref 0–0.51)
EOSINOPHIL NFR BLD: 0.9 % (ref 0–6.9)
EPI CELLS #/AREA URNS HPF: ABNORMAL /HPF
ERYTHROCYTE [DISTWIDTH] IN BLOOD BY AUTOMATED COUNT: 42.1 FL (ref 35.9–50)
GFR SERPL CREATININE-BSD FRML MDRD: >60 ML/MIN/1.73 M 2
GLOBULIN SER CALC-MCNC: 2.8 G/DL (ref 1.9–3.5)
GLUCOSE SERPL-MCNC: 101 MG/DL (ref 65–99)
GLUCOSE UR STRIP.AUTO-MCNC: NEGATIVE MG/DL
HCT VFR BLD AUTO: 37 % (ref 37–47)
HGB BLD-MCNC: 12.4 G/DL (ref 12–16)
IMM GRANULOCYTES # BLD AUTO: 0.03 K/UL (ref 0–0.11)
IMM GRANULOCYTES NFR BLD AUTO: 0.3 % (ref 0–0.9)
KETONES UR STRIP.AUTO-MCNC: NEGATIVE MG/DL
LACTATE BLD-SCNC: 0.93 MMOL/L (ref 0.5–2)
LACTATE BLD-SCNC: 0.98 MMOL/L (ref 0.5–2)
LEUKOCYTE ESTERASE UR QL STRIP.AUTO: ABNORMAL
LYMPHOCYTES # BLD AUTO: 1.85 K/UL (ref 1–4.8)
LYMPHOCYTES NFR BLD: 19.4 % (ref 22–41)
MCH RBC QN AUTO: 29.9 PG (ref 27–33)
MCHC RBC AUTO-ENTMCNC: 33.5 G/DL (ref 33.6–35)
MCV RBC AUTO: 89.2 FL (ref 81.4–97.8)
MICRO URNS: ABNORMAL
MONOCYTES # BLD AUTO: 0.58 K/UL (ref 0–0.85)
MONOCYTES NFR BLD AUTO: 6.1 % (ref 0–13.4)
NEUTROPHILS # BLD AUTO: 6.93 K/UL (ref 2–7.15)
NEUTROPHILS NFR BLD: 72.8 % (ref 44–72)
NITRITE UR QL STRIP.AUTO: POSITIVE
NRBC # BLD AUTO: 0 K/UL
NRBC BLD AUTO-RTO: 0 /100 WBC
PH UR STRIP.AUTO: 6 [PH]
PLATELET # BLD AUTO: 290 K/UL (ref 164–446)
PMV BLD AUTO: 9.9 FL (ref 9–12.9)
POTASSIUM SERPL-SCNC: 3.7 MMOL/L (ref 3.6–5.5)
PROT SERPL-MCNC: 6.3 G/DL (ref 6–8.2)
PROT UR QL STRIP: NEGATIVE MG/DL
RBC # BLD AUTO: 4.15 M/UL (ref 4.2–5.4)
RBC # URNS HPF: ABNORMAL /HPF
RBC UR QL AUTO: NEGATIVE
SODIUM SERPL-SCNC: 136 MMOL/L (ref 135–145)
SP GR UR STRIP.AUTO: 1.01
SPECIMEN DRAWN FROM PATIENT: NORMAL
SPECIMEN DRAWN FROM PATIENT: NORMAL
WBC # BLD AUTO: 9.5 K/UL (ref 4.8–10.8)
WBC #/AREA URNS HPF: ABNORMAL /HPF

## 2017-08-26 PROCEDURE — 303353 HCHG DERMABOND SKIN ADHESIVE

## 2017-08-26 PROCEDURE — 700105 HCHG RX REV CODE 258: Performed by: EMERGENCY MEDICINE

## 2017-08-26 PROCEDURE — 96375 TX/PRO/DX INJ NEW DRUG ADDON: CPT | Mod: XU

## 2017-08-26 PROCEDURE — 700111 HCHG RX REV CODE 636 W/ 250 OVERRIDE (IP): Performed by: EMERGENCY MEDICINE

## 2017-08-26 PROCEDURE — 71010 DX-CHEST-PORTABLE (1 VIEW): CPT

## 2017-08-26 PROCEDURE — 304999 HCHG REPAIR-SIMPLE/INTERMED LEVEL 1

## 2017-08-26 PROCEDURE — 99284 EMERGENCY DEPT VISIT MOD MDM: CPT

## 2017-08-26 PROCEDURE — 73610 X-RAY EXAM OF ANKLE: CPT | Mod: LT

## 2017-08-26 PROCEDURE — A9270 NON-COVERED ITEM OR SERVICE: HCPCS | Performed by: EMERGENCY MEDICINE

## 2017-08-26 PROCEDURE — 85025 COMPLETE CBC W/AUTO DIFF WBC: CPT

## 2017-08-26 PROCEDURE — 87086 URINE CULTURE/COLONY COUNT: CPT

## 2017-08-26 PROCEDURE — 36415 COLL VENOUS BLD VENIPUNCTURE: CPT

## 2017-08-26 PROCEDURE — 93005 ELECTROCARDIOGRAM TRACING: CPT | Performed by: EMERGENCY MEDICINE

## 2017-08-26 PROCEDURE — 73610 X-RAY EXAM OF ANKLE: CPT | Mod: RT

## 2017-08-26 PROCEDURE — 83605 ASSAY OF LACTIC ACID: CPT

## 2017-08-26 PROCEDURE — 81001 URINALYSIS AUTO W/SCOPE: CPT

## 2017-08-26 PROCEDURE — 73030 X-RAY EXAM OF SHOULDER: CPT | Mod: RT

## 2017-08-26 PROCEDURE — 87040 BLOOD CULTURE FOR BACTERIA: CPT | Mod: 91

## 2017-08-26 PROCEDURE — 80053 COMPREHEN METABOLIC PANEL: CPT

## 2017-08-26 PROCEDURE — 700102 HCHG RX REV CODE 250 W/ 637 OVERRIDE(OP): Performed by: EMERGENCY MEDICINE

## 2017-08-26 PROCEDURE — 96365 THER/PROPH/DIAG IV INF INIT: CPT | Mod: XU

## 2017-08-26 PROCEDURE — 70450 CT HEAD/BRAIN W/O DYE: CPT

## 2017-08-26 PROCEDURE — 304217 HCHG IRRIGATION SYSTEM

## 2017-08-26 PROCEDURE — 72125 CT NECK SPINE W/O DYE: CPT

## 2017-08-26 RX ORDER — CEFDINIR 300 MG/1
300 CAPSULE ORAL 2 TIMES DAILY
Qty: 20 CAP | Refills: 0 | Status: SHIPPED | OUTPATIENT
Start: 2017-08-26

## 2017-08-26 RX ORDER — SODIUM CHLORIDE 9 MG/ML
30 INJECTION, SOLUTION INTRAVENOUS ONCE
Status: COMPLETED | OUTPATIENT
Start: 2017-08-26 | End: 2017-08-26

## 2017-08-26 RX ORDER — ACETAMINOPHEN 160 MG/5ML
LIQUID ORAL
Status: COMPLETED
Start: 2017-08-26 | End: 2017-08-26

## 2017-08-26 RX ORDER — ACETAMINOPHEN 160 MG/5ML
650 SUSPENSION ORAL ONCE
Status: COMPLETED | OUTPATIENT
Start: 2017-08-26 | End: 2017-08-26

## 2017-08-26 RX ORDER — LORAZEPAM 2 MG/ML
0.5 INJECTION INTRAMUSCULAR ONCE
Status: COMPLETED | OUTPATIENT
Start: 2017-08-26 | End: 2017-08-26

## 2017-08-26 RX ORDER — CEFTRIAXONE 1 G/1
1 INJECTION, POWDER, FOR SOLUTION INTRAMUSCULAR; INTRAVENOUS ONCE
Status: COMPLETED | OUTPATIENT
Start: 2017-08-26 | End: 2017-08-26

## 2017-08-26 RX ORDER — ACETAMINOPHEN 325 MG/1
650 TABLET ORAL ONCE
Status: DISCONTINUED | OUTPATIENT
Start: 2017-08-26 | End: 2017-08-26

## 2017-08-26 RX ORDER — SODIUM CHLORIDE 9 MG/ML
1000 INJECTION, SOLUTION INTRAVENOUS
Status: DISCONTINUED | OUTPATIENT
Start: 2017-08-26 | End: 2017-08-26 | Stop reason: HOSPADM

## 2017-08-26 RX ADMIN — ACETAMINOPHEN 650 MG: 160 SUSPENSION ORAL at 20:39

## 2017-08-26 RX ADMIN — LORAZEPAM 0.5 MG: 2 INJECTION INTRAMUSCULAR; INTRAVENOUS at 20:26

## 2017-08-26 RX ADMIN — SODIUM CHLORIDE 1623 ML: 9 INJECTION, SOLUTION INTRAVENOUS at 17:30

## 2017-08-26 RX ADMIN — CEFTRIAXONE 1 G: 1 INJECTION, POWDER, FOR SOLUTION INTRAMUSCULAR; INTRAVENOUS at 20:26

## 2017-08-26 ASSESSMENT — PAIN SCALES - GENERAL: PAINLEVEL_OUTOF10: 0

## 2017-08-26 NOTE — ED NOTES
Pt jaimie ROSA post GLF. Per faculty at [place] pt has had multiple falls from her wheelchair this past week. Today pt leaned forward landing on her head. Unsure of LOC. Pt has hx Parkinson's and Dementia. Oriented to self.

## 2017-08-27 ENCOUNTER — PATIENT OUTREACH (OUTPATIENT)
Dept: HEALTH INFORMATION MANAGEMENT | Facility: OTHER | Age: 72
End: 2017-08-27

## 2017-08-27 NOTE — ED NOTES
Report called to Shavonne at [facility]. Pt left our facility in stable condition. Prescription sent with pt to be filled at [facility]. Pt left ED in stable condition.

## 2017-08-27 NOTE — DISCHARGE INSTRUCTIONS
For your laceration, you have glue over the cut, this does not require any management aside from gentle washing      Urinary Tract Infection  A urinary tract infection (UTI) can occur any place along the urinary tract. The tract includes the kidneys, ureters, bladder, and urethra. A type of germ called bacteria often causes a UTI. UTIs are often helped with antibiotic medicine.   HOME CARE   · If given, take antibiotics as told by your doctor. Finish them even if you start to feel better.  · Drink enough fluids to keep your pee (urine) clear or pale yellow.  · Avoid tea, drinks with caffeine, and bubbly (carbonated) drinks.  · Pee often. Avoid holding your pee in for a long time.  · Pee before and after having sex (intercourse).  · Wipe from front to back after you poop (bowel movement) if you are a woman. Use each tissue only once.  GET HELP RIGHT AWAY IF:   · You have back pain.  · You have lower belly (abdominal) pain.  · You have chills.  · You feel sick to your stomach (nauseous).  · You throw up (vomit).  · Your burning or discomfort with peeing does not go away.  · You have a fever.  · Your symptoms are not better in 3 days.  MAKE SURE YOU:   · Understand these instructions.  · Will watch your condition.  · Will get help right away if you are not doing well or get worse.     This information is not intended to replace advice given to you by your health care provider. Make sure you discuss any questions you have with your health care provider.     Document Released: 06/05/2009 Document Revised: 01/08/2016 Document Reviewed: 07/18/2013  [employer] Interactive Patient Education ©2016 [employer]    Facial Laceration   A facial laceration is a cut on the face. These injuries can be painful and cause bleeding. Lacerations usually heal quickly, but they need special care to reduce scarring.  DIAGNOSIS   Your health care provider will take a medical history, ask for details about how the injury occurred, and  examine the wound to determine how deep the cut is.  TREATMENT   Some facial lacerations may not require closure. Others may not be able to be closed because of an increased risk of infection. The risk of infection and the chance for successful closure will depend on various factors, including the amount of time since the injury occurred.  The wound may be cleaned to help prevent infection. If closure is appropriate, pain medicines may be given if needed. Your health care provider will use stitches (sutures), wound glue (adhesive), or skin adhesive strips to repair the laceration. These tools bring the skin edges together to allow for faster healing and a better cosmetic outcome. If needed, you may also be given a tetanus shot.  HOME CARE INSTRUCTIONS  · Only take over-the-counter or prescription medicines as directed by your health care provider.  · Follow your health care provider's instructions for wound care. These instructions will vary depending on the technique used for closing the wound.  For Sutures:  · Keep the wound clean and dry.    · If you were given a bandage (dressing), you should change it at least once a day. Also change the dressing if it becomes wet or dirty, or as directed by your health care provider.    · Wash the wound with soap and water 2 times a day. Rinse the wound off with water to remove all soap. Pat the wound dry with a clean towel.    · After cleaning, apply a thin layer of the antibiotic ointment recommended by your health care provider. This will help prevent infection and keep the dressing from sticking.    · You may shower as usual after the first 24 hours. Do not soak the wound in water until the sutures are removed.    · Get your sutures removed as directed by your health care provider. With facial lacerations, sutures should usually be taken out after 4-5 days to avoid stitch marks.    · Wait a few days after your sutures are removed before applying any makeup.  For Skin  Adhesive Strips:  · Keep the wound clean and dry.    · Do not get the skin adhesive strips wet. You may bathe carefully, using caution to keep the wound dry.    · If the wound gets wet, pat it dry with a clean towel.    · Skin adhesive strips will fall off on their own. You may trim the strips as the wound heals. Do not remove skin adhesive strips that are still stuck to the wound. They will fall off in time.    For Wound Adhesive:  · You may briefly wet your wound in the shower or bath. Do not soak or scrub the wound. Do not swim. Avoid periods of heavy sweating until the skin adhesive has fallen off on its own. After showering or bathing, gently pat the wound dry with a clean towel.    · Do not apply liquid medicine, cream medicine, ointment medicine, or makeup to your wound while the skin adhesive is in place. This may loosen the film before your wound is healed.    · If a dressing is placed over the wound, be careful not to apply tape directly over the skin adhesive. This may cause the adhesive to be pulled off before the wound is healed.    · Avoid prolonged exposure to sunlight or tanning lamps while the skin adhesive is in place.  · The skin adhesive will usually remain in place for 5-10 days, then naturally fall off the skin. Do not pick at the adhesive film.    After Healing:  Once the wound has healed, cover the wound with sunscreen during the day for 1 full year. This can help minimize scarring. Exposure to ultraviolet light in the first year will darken the scar. It can take 1-2 years for the scar to lose its redness and to heal completely.   SEEK MEDICAL CARE IF:  · You have a fever.  SEEK IMMEDIATE MEDICAL CARE IF:  · You have redness, pain, or swelling around the wound.    · You see a yellowish-white fluid (pus) coming from the wound.       This information is not intended to replace advice given to you by your health care provider. Make sure you discuss any questions you have with your health care  provider.     Document Released: 01/25/2006 Document Revised: 01/08/2016 Document Reviewed: 07/31/2014  [employer] Interactive Patient Education ©2016 [employer]

## 2017-08-27 NOTE — ED NOTES
Assisted with patient care. Procedure explained to patient and family. Park catheter removed. 600 ml urine drained. Patient tolerated well.

## 2017-08-27 NOTE — ED PROVIDER NOTES
ED Provider Note    Primary care provider: Camila Malin M.D.  Means of arrival: Ambulance  History obtained from: Provider  History limited by: Patient's dementia    CHIEF COMPLAINT  Chief Complaint   Patient presents with   • GLF   • Head Injury       HPI  Paola Brito is a 72 y.o. female with past medical history significant for hypertension, Parkinson's, dementia, and urinary incontinence who presents to the Emergency Department after a fall. History is limited by patient as she has dementia and cannot provide any history. Per care provider patient has been falling more than normal from her wheelchair over last week. Patient does fall occasionally at baseline, care provider reports that patient forgets that she is weak  and occasionally falls. However over the last week it seems that she has had increased falls. Today she fell and subsequently obtained a laceration to her right forehead and therefore they brought her in. Care provider also reports the patient fell onto her right shoulder and her ankles appear swollen when compared to prior to the fall. Patient is demented and cannot provide further history. Patient denies any pain. Care provider reports the patient has not been sick, had a cough, fevers, or emesis over the last couple days. She is incontinent at baseline.    REVIEW OF SYSTEMS  ROS  Patient denies pain, further review of systems cannot be obtained secondary to dementia    PAST MEDICAL HISTORY   has a past medical history of Hypertension; Parkinson's disease ([facility]); and Urinary incontinence.    SURGICAL HISTORY   has a past surgical history that includes tonsillectomy (N/A).    SOCIAL HISTORY  Social History   Substance Use Topics   • Smoking status: Former Smoker     Types: Cigarettes   • Smokeless tobacco: Never Used   • Alcohol use Yes      Comment: a couple glasses a couple times per week      History   Drug Use No       FAMILY HISTORY  Family History   Problem Relation Age of  Onset   • Cancer Father    • Lung Disease Father    • Heart Disease Father    • Heart Disease Maternal Grandmother    • Heart Disease Maternal Grandfather    • Stroke Paternal Grandmother    • Stroke Paternal Grandfather        CURRENT MEDICATIONS  Home Medications    **Home medications have not yet been reviewed for this encounter**         ALLERGIES  Allergies   Allergen Reactions   • Codeine Unspecified     Pt unable to give answer.    • Morphine Unspecified     Rxn - early    Family reports that she almost    • Pcn [Penicillins] Unspecified     Rxn - unspecified       PHYSICAL EXAM  VITAL SIGNS: /79   Pulse 90   Temp 38 °C (100.4 °F)   Resp 17   Wt 54.1 kg (119 lb 4.3 oz)   SpO2 100%   BMI 19.26 kg/m²   Vitals reviewed by myself.  Physical Exam   Constitutional: She is well-developed, well-nourished, and in no distress.   HENT:   Patient has a 2 cm laceration to her right forehead just above her right eyebrow, head is otherwise atraumatic   Eyes: EOM are normal. Pupils are equal, round, and reactive to light.   Neck:   Normal range of motion of neck, no midline C-spine tenderness is present   Cardiovascular: Normal rate, regular rhythm and normal heart sounds.  Exam reveals no gallop and no friction rub.    No murmur heard.  Pulmonary/Chest: Effort normal. No respiratory distress. She has no wheezes. She has no rales.   Abdominal: Soft. She exhibits no distension. There is no tenderness. There is no rebound.   Musculoskeletal: Normal range of motion.   Patient moves all extremities spontaneously, although she does not follow commands to squeeze her hands and push her legs down.   Neurological:   Patient is alert to self only, this is her baseline per care provider. Sensation intact in all extremities   Skin: Skin is warm and dry.       DIAGNOSTIC STUDIES /  LABS  Labs Reviewed   CBC WITH DIFFERENTIAL - Abnormal; Notable for the following:        Result Value    RBC 4.15 (*)     MCHC 33.5  "(*)     Neutrophils-Polys 72.80 (*)     Lymphocytes 19.40 (*)     All other components within normal limits   COMP METABOLIC PANEL - Abnormal; Notable for the following:     Glucose 101 (*)     All other components within normal limits   URINALYSIS - Abnormal; Notable for the following:     Character Cloudy (*)     Nitrite Positive (*)     Leukocyte Esterase Small (*)     All other components within normal limits    Narrative:     Indication for culture:->Emergency Room Patient   URINE MICROSCOPIC (W/UA) - Abnormal; Notable for the following:     WBC 5-10 (*)     Bacteria Many (*)     All other components within normal limits    Narrative:     Indication for culture:->Emergency Room Patient   LACTIC ACID   LACTIC ACID   URINE CULTURE(NEW)    Narrative:     Indication for culture:->Emergency Room Patient   BLOOD CULTURE    Narrative:     Per Hospital Policy: Only change Specimen Src: to \"Line\" if  specified by physician order.   BLOOD CULTURE    Narrative:     Per Hospital Policy: Only change Specimen Src: to \"Line\" if  specified by physician order.   ESTIMATED GFR   LACTIC ACID      All labs reviewed by me.    EKG Interpretation:  Interpreted by myself    12 Lead EKG interpreted by me to show:  Time: 1723  Normal sinus rhythm  Rate 76  Axis: Normal  Intervals: Normal  Normal T waves  Normal ST segments  Poor R-wave progression  My impression of this EKG: Does not indicate ischemia or arrhythmia at this time.    RADIOLOGY  DX-SHOULDER 2+ RIGHT   Final Result      No evidence of acute fracture or dislocation.      DX-ANKLE 3+ VIEWS LEFT   Final Result      No evidence of acute fracture or dislocation.      DX-ANKLE 3+ VIEWS RIGHT   Final Result      No evidence of acute fracture or dislocation.      DX-CHEST-PORTABLE (1 VIEW)   Final Result      No evidence of acute cardiopulmonary process.      CT-CSPINE WITHOUT PLUS RECONS   Final Result      1.  No evidence of cervical spine fracture.      2.  Multilevel " degenerative disc disease and facet degeneration.      CT-HEAD W/O   Final Result      No evidence of acute intracranial process.        The radiologist's interpretation of all radiological studies have been reviewed by me.    PROCEDURES  Laceration Repair Procedure Note    Indication: Laceration    Procedure: The patient was placed in the appropriate position. No anesthesia was required as wound glue was used. THe area was then cleansed with normal saline. The laceration was closed with Dermabond. There were no additional lacerations requiring repair. The wound area was then dressed with a bandage.      Total repaired wound length: 2 cm.     Other Items: None    The patient tolerated the procedure well.    Complications: None      COURSE & MEDICAL DECISION MAKING  Nursing notes, VS, PMSFHx reviewed in chart.    Patient is a 72-year-old female who comes in after a fall. Differential diagnosis includes intracranial hemorrhage, concussion, spinal injury, fracture, laceration, abrasion, pneumonia, arrhythmia, UTI, bacteremia. Diagnostic workup included labs, CT head, CT neck, chest x-ray, and urinalysis.    Patient's initial vitals notable for fever, therefore there is concern the patient may have an infection that is making her weaker and increasing her falls. Therefore we will obtain blood cultures, urinalysis, urine culture, and chest x-ray to assess for source of fever. Patient is treated with Tylenol.Patient's imaging returns demonstrates evidence of intracranial abnormality. X-rays are negative for fracture. Urinalysis is positive for infection, which is likely the cause of patient's fever. Lactate is within normal limits and white count is also within normal limits making sepsis unlikely. After treatment with Tylenol and IV fluids patient's vital signs normalized. Patient's laceration is repaired using Dermabond, please see procedure note above for details. Given patient's baseline weakness without any acute  injuries, we will send her back to her nursing home. Patient's sister is agreeable to this plan. She will be provided a prescription for cefdinir for her UTI. Patient is transferred back to her nursing home in stable condition.      FINAL IMPRESSION  1. Acute UTI    2. Weakness    3. Fall, initial encounter    4. Laceration of forehead, initial encounter

## 2017-08-28 LAB
BACTERIA UR CULT: NORMAL
SIGNIFICANT IND 70042: NORMAL
SITE SITE: NORMAL
SOURCE SOURCE: NORMAL

## 2017-10-26 NOTE — ADDENDUM NOTE
Encounter addended by: Juli Holloway on: 10/26/2017 11:59 AM<BR>    Actions taken: Flowsheet accepted

## 2024-01-01 NOTE — PROGRESS NOTES
Report given to day RN.   Pt is more cooperative this time.  Was able to change pads and pt also participates in turning.  Explanations given regarding changing and maintaining cleanliness. Pt reports understanding, pt is still disoriented to time, event and person.  Pt is looking forward to meeting with Mylene, her sister this morning.    DISPLAY PLAN FREE TEXT